# Patient Record
Sex: FEMALE | Race: BLACK OR AFRICAN AMERICAN | Employment: UNEMPLOYED | URBAN - METROPOLITAN AREA
[De-identification: names, ages, dates, MRNs, and addresses within clinical notes are randomized per-mention and may not be internally consistent; named-entity substitution may affect disease eponyms.]

---

## 2021-11-04 LAB — PAP SMEAR, EXTERNAL: NORMAL

## 2022-04-14 ENCOUNTER — TRANSCRIBE ORDER (OUTPATIENT)
Dept: REGISTRATION | Age: 32
End: 2022-04-14

## 2022-04-14 ENCOUNTER — HOSPITAL ENCOUNTER (OUTPATIENT)
Dept: GENERAL RADIOLOGY | Age: 32
Discharge: HOME | End: 2022-04-14
Payer: OTHER GOVERNMENT

## 2022-04-14 DIAGNOSIS — M50.30 DEGENERATION OF CERVICAL INTERVERTEBRAL DISC: Primary | ICD-10-CM

## 2022-04-14 DIAGNOSIS — M50.30 DEGENERATION OF CERVICAL INTERVERTEBRAL DISC: ICD-10-CM

## 2022-04-14 PROCEDURE — 72050 X-RAY EXAM NECK SPINE 4/5VWS: CPT

## 2022-09-26 LAB — HBA1C MFR BLD HPLC: 5.7 %

## 2022-11-08 ENCOUNTER — ANESTHESIA EVENT (OUTPATIENT)
Dept: SURGERY | Age: 32
End: 2022-11-08
Payer: OTHER GOVERNMENT

## 2022-11-09 ENCOUNTER — HOSPITAL ENCOUNTER (OUTPATIENT)
Age: 32
Setting detail: OUTPATIENT SURGERY
Discharge: HOME | End: 2022-11-09
Attending: OBSTETRICS & GYNECOLOGY | Admitting: OBSTETRICS & GYNECOLOGY
Payer: OTHER GOVERNMENT

## 2022-11-09 ENCOUNTER — ANESTHESIA (OUTPATIENT)
Dept: SURGERY | Age: 32
End: 2022-11-09
Payer: OTHER GOVERNMENT

## 2022-11-09 VITALS
HEART RATE: 78 BPM | WEIGHT: 134.92 LBS | RESPIRATION RATE: 13 BRPM | HEIGHT: 59 IN | DIASTOLIC BLOOD PRESSURE: 86 MMHG | OXYGEN SATURATION: 100 % | TEMPERATURE: 98.5 F | BODY MASS INDEX: 27.2 KG/M2 | SYSTOLIC BLOOD PRESSURE: 143 MMHG

## 2022-11-09 LAB — HCG UR QL: NEGATIVE

## 2022-11-09 PROCEDURE — 2709999900 HC NON-CHARGEABLE SUPPLY: Performed by: OBSTETRICS & GYNECOLOGY

## 2022-11-09 PROCEDURE — 77030026236 HC DEV TISS RMVL MYOSUR HOLO -G1: Performed by: OBSTETRICS & GYNECOLOGY

## 2022-11-09 PROCEDURE — 88305 TISSUE EXAM BY PATHOLOGIST: CPT

## 2022-11-09 PROCEDURE — 77030040361 HC SLV COMPR DVT MDII -B

## 2022-11-09 PROCEDURE — 76210000021 HC REC RM PH II 0.5 TO 1 HR: Performed by: OBSTETRICS & GYNECOLOGY

## 2022-11-09 PROCEDURE — 76010000149 HC OR TIME 1 TO 1.5 HR: Performed by: OBSTETRICS & GYNECOLOGY

## 2022-11-09 PROCEDURE — 74011000250 HC RX REV CODE- 250: Performed by: OBSTETRICS & GYNECOLOGY

## 2022-11-09 PROCEDURE — 77030020143 HC AIRWY LARYN INTUB CGAS -A: Performed by: ANESTHESIOLOGY

## 2022-11-09 PROCEDURE — 74011000250 HC RX REV CODE- 250: Performed by: NURSE ANESTHETIST, CERTIFIED REGISTERED

## 2022-11-09 PROCEDURE — 81025 URINE PREGNANCY TEST: CPT

## 2022-11-09 PROCEDURE — 76060000033 HC ANESTHESIA 1 TO 1.5 HR: Performed by: OBSTETRICS & GYNECOLOGY

## 2022-11-09 PROCEDURE — 77030034928 HC DEV UTER SURSND KT HOLO -G1: Performed by: OBSTETRICS & GYNECOLOGY

## 2022-11-09 PROCEDURE — 74011250636 HC RX REV CODE- 250/636: Performed by: OBSTETRICS & GYNECOLOGY

## 2022-11-09 PROCEDURE — 74011250636 HC RX REV CODE- 250/636: Performed by: NURSE ANESTHETIST, CERTIFIED REGISTERED

## 2022-11-09 PROCEDURE — 76210000006 HC OR PH I REC 0.5 TO 1 HR: Performed by: OBSTETRICS & GYNECOLOGY

## 2022-11-09 PROCEDURE — 77030003666 HC NDL SPINAL BD -A: Performed by: OBSTETRICS & GYNECOLOGY

## 2022-11-09 PROCEDURE — 74011250636 HC RX REV CODE- 250/636: Performed by: ANESTHESIOLOGY

## 2022-11-09 PROCEDURE — 77030040922 HC BLNKT HYPOTHRM STRY -A

## 2022-11-09 PROCEDURE — 77030041423 HC SYST FLUID MNGMT FLUENT HOLO -D: Performed by: OBSTETRICS & GYNECOLOGY

## 2022-11-09 RX ORDER — ONDANSETRON 2 MG/ML
4 INJECTION INTRAMUSCULAR; INTRAVENOUS AS NEEDED
Status: DISCONTINUED | OUTPATIENT
Start: 2022-11-09 | End: 2022-11-09 | Stop reason: HOSPADM

## 2022-11-09 RX ORDER — FENTANYL CITRATE 50 UG/ML
INJECTION, SOLUTION INTRAMUSCULAR; INTRAVENOUS AS NEEDED
Status: DISCONTINUED | OUTPATIENT
Start: 2022-11-09 | End: 2022-11-09 | Stop reason: HOSPADM

## 2022-11-09 RX ORDER — SODIUM CHLORIDE 0.9 % (FLUSH) 0.9 %
5-40 SYRINGE (ML) INJECTION AS NEEDED
Status: DISCONTINUED | OUTPATIENT
Start: 2022-11-09 | End: 2022-11-09 | Stop reason: HOSPADM

## 2022-11-09 RX ORDER — NAPROXEN 500 MG/1
500 TABLET ORAL
Qty: 20 TABLET | Refills: 0 | Status: SHIPPED | OUTPATIENT
Start: 2022-11-09

## 2022-11-09 RX ORDER — SODIUM CHLORIDE, SODIUM LACTATE, POTASSIUM CHLORIDE, CALCIUM CHLORIDE 600; 310; 30; 20 MG/100ML; MG/100ML; MG/100ML; MG/100ML
125 INJECTION, SOLUTION INTRAVENOUS CONTINUOUS
Status: DISCONTINUED | OUTPATIENT
Start: 2022-11-09 | End: 2022-11-09 | Stop reason: HOSPADM

## 2022-11-09 RX ORDER — LIDOCAINE HYDROCHLORIDE 10 MG/ML
0.1 INJECTION, SOLUTION EPIDURAL; INFILTRATION; INTRACAUDAL; PERINEURAL AS NEEDED
Status: DISCONTINUED | OUTPATIENT
Start: 2022-11-09 | End: 2022-11-09 | Stop reason: HOSPADM

## 2022-11-09 RX ORDER — KETOROLAC TROMETHAMINE 15 MG/ML
INJECTION, SOLUTION INTRAMUSCULAR; INTRAVENOUS AS NEEDED
Status: DISCONTINUED | OUTPATIENT
Start: 2022-11-09 | End: 2022-11-09 | Stop reason: HOSPADM

## 2022-11-09 RX ORDER — NALOXONE HYDROCHLORIDE 0.4 MG/ML
0.2 INJECTION, SOLUTION INTRAMUSCULAR; INTRAVENOUS; SUBCUTANEOUS
Status: DISCONTINUED | OUTPATIENT
Start: 2022-11-09 | End: 2022-11-09 | Stop reason: HOSPADM

## 2022-11-09 RX ORDER — BUPIVACAINE HYDROCHLORIDE 2.5 MG/ML
INJECTION, SOLUTION EPIDURAL; INFILTRATION; INTRACAUDAL AS NEEDED
Status: DISCONTINUED | OUTPATIENT
Start: 2022-11-09 | End: 2022-11-09 | Stop reason: HOSPADM

## 2022-11-09 RX ORDER — ONDANSETRON 2 MG/ML
INJECTION INTRAMUSCULAR; INTRAVENOUS AS NEEDED
Status: DISCONTINUED | OUTPATIENT
Start: 2022-11-09 | End: 2022-11-09 | Stop reason: HOSPADM

## 2022-11-09 RX ORDER — DEXAMETHASONE SODIUM PHOSPHATE 4 MG/ML
INJECTION, SOLUTION INTRA-ARTICULAR; INTRALESIONAL; INTRAMUSCULAR; INTRAVENOUS; SOFT TISSUE AS NEEDED
Status: DISCONTINUED | OUTPATIENT
Start: 2022-11-09 | End: 2022-11-09 | Stop reason: HOSPADM

## 2022-11-09 RX ORDER — SODIUM CHLORIDE, SODIUM LACTATE, POTASSIUM CHLORIDE, CALCIUM CHLORIDE 600; 310; 30; 20 MG/100ML; MG/100ML; MG/100ML; MG/100ML
100 INJECTION, SOLUTION INTRAVENOUS CONTINUOUS
Status: DISCONTINUED | OUTPATIENT
Start: 2022-11-09 | End: 2022-11-09 | Stop reason: HOSPADM

## 2022-11-09 RX ORDER — LIDOCAINE HYDROCHLORIDE 20 MG/ML
INJECTION, SOLUTION EPIDURAL; INFILTRATION; INTRACAUDAL; PERINEURAL AS NEEDED
Status: DISCONTINUED | OUTPATIENT
Start: 2022-11-09 | End: 2022-11-09 | Stop reason: HOSPADM

## 2022-11-09 RX ORDER — SODIUM CHLORIDE 0.9 % (FLUSH) 0.9 %
5-40 SYRINGE (ML) INJECTION EVERY 8 HOURS
Status: DISCONTINUED | OUTPATIENT
Start: 2022-11-09 | End: 2022-11-09 | Stop reason: HOSPADM

## 2022-11-09 RX ORDER — MIDAZOLAM HYDROCHLORIDE 1 MG/ML
INJECTION, SOLUTION INTRAMUSCULAR; INTRAVENOUS AS NEEDED
Status: DISCONTINUED | OUTPATIENT
Start: 2022-11-09 | End: 2022-11-09 | Stop reason: HOSPADM

## 2022-11-09 RX ORDER — PROPOFOL 10 MG/ML
INJECTION, EMULSION INTRAVENOUS AS NEEDED
Status: DISCONTINUED | OUTPATIENT
Start: 2022-11-09 | End: 2022-11-09 | Stop reason: HOSPADM

## 2022-11-09 RX ORDER — FLUMAZENIL 0.1 MG/ML
0.2 INJECTION INTRAVENOUS
Status: DISCONTINUED | OUTPATIENT
Start: 2022-11-09 | End: 2022-11-09 | Stop reason: HOSPADM

## 2022-11-09 RX ORDER — HYDROMORPHONE HYDROCHLORIDE 1 MG/ML
.5-1 INJECTION, SOLUTION INTRAMUSCULAR; INTRAVENOUS; SUBCUTANEOUS
Status: DISCONTINUED | OUTPATIENT
Start: 2022-11-09 | End: 2022-11-09 | Stop reason: HOSPADM

## 2022-11-09 RX ADMIN — FENTANYL CITRATE 25 MCG: 50 INJECTION, SOLUTION INTRAMUSCULAR; INTRAVENOUS at 13:43

## 2022-11-09 RX ADMIN — FENTANYL CITRATE 25 MCG: 50 INJECTION, SOLUTION INTRAMUSCULAR; INTRAVENOUS at 14:09

## 2022-11-09 RX ADMIN — CEFAZOLIN SODIUM 2 G: 1 POWDER, FOR SOLUTION INTRAMUSCULAR; INTRAVENOUS at 13:56

## 2022-11-09 RX ADMIN — MIDAZOLAM HYDROCHLORIDE 2 MG: 1 INJECTION, SOLUTION INTRAMUSCULAR; INTRAVENOUS at 13:41

## 2022-11-09 RX ADMIN — PROPOFOL 50 MG: 10 INJECTION, EMULSION INTRAVENOUS at 13:50

## 2022-11-09 RX ADMIN — FENTANYL CITRATE 25 MCG: 50 INJECTION, SOLUTION INTRAMUSCULAR; INTRAVENOUS at 13:46

## 2022-11-09 RX ADMIN — PROPOFOL 150 MG: 10 INJECTION, EMULSION INTRAVENOUS at 13:48

## 2022-11-09 RX ADMIN — FENTANYL CITRATE 25 MCG: 50 INJECTION, SOLUTION INTRAMUSCULAR; INTRAVENOUS at 13:55

## 2022-11-09 RX ADMIN — HYDROMORPHONE HYDROCHLORIDE 0.5 MG: 1 INJECTION, SOLUTION INTRAMUSCULAR; INTRAVENOUS; SUBCUTANEOUS at 15:38

## 2022-11-09 RX ADMIN — SODIUM CHLORIDE, POTASSIUM CHLORIDE, SODIUM LACTATE AND CALCIUM CHLORIDE 100 ML/HR: 600; 310; 30; 20 INJECTION, SOLUTION INTRAVENOUS at 11:56

## 2022-11-09 RX ADMIN — ONDANSETRON HYDROCHLORIDE 4 MG: 2 SOLUTION INTRAMUSCULAR; INTRAVENOUS at 14:07

## 2022-11-09 RX ADMIN — KETOROLAC TROMETHAMINE 30 MG: 15 INJECTION, SOLUTION INTRAMUSCULAR; INTRAVENOUS at 14:46

## 2022-11-09 RX ADMIN — DEXAMETHASONE SODIUM PHOSPHATE 8 MG: 4 INJECTION, SOLUTION INTRAMUSCULAR; INTRAVENOUS at 13:55

## 2022-11-09 RX ADMIN — LIDOCAINE HYDROCHLORIDE 60 MG: 20 INJECTION, SOLUTION EPIDURAL; INFILTRATION; INTRACAUDAL; PERINEURAL at 13:48

## 2022-11-09 NOTE — ANESTHESIA PREPROCEDURE EVALUATION
Relevant Problems   No relevant active problems       Anesthetic History   No history of anesthetic complications            Review of Systems / Medical History  Patient summary reviewed and pertinent labs reviewed    Pulmonary  Within defined limits                 Neuro/Psych   Within defined limits        Pertinent negatives: No seizures, TIA and CVA   Cardiovascular                Pertinent negatives: No past MI, angina and CHF  Exercise tolerance: >4 METS     GI/Hepatic/Renal  Within defined limits           Pertinent negatives: No renal disease   Endo/Other  Within defined limits        Pertinent negatives: No diabetes   Other Findings              Physical Exam    Airway  Mallampati: II  TM Distance: 4 - 6 cm  Neck ROM: normal range of motion   Mouth opening: Normal     Cardiovascular      Rate: normal         Dental  No notable dental hx       Pulmonary  Breath sounds clear to auscultation               Abdominal  GI exam deferred       Other Findings            Anesthetic Plan    ASA: 1  Anesthesia type: general          Induction: Intravenous  Anesthetic plan and risks discussed with: Patient      GA, LMA

## 2022-11-09 NOTE — BRIEF OP NOTE
Brief Postoperative Note    Patient: Levorn Means  YOB: 1990  MRN: [id number]    Date of Procedure: 11/9/2022     Pre-Op Diagnosis:   Menorrhagia with regular cycle [N92.0]  Polycystic ovarian disease [E28.2]    Post-Op Diagnosis: Same as preoperative diagnosis. Procedure(s): HYSTEROSCOPIC DILATION AND CURETTAGE WITH MYOSURE  ENDOMETRIAL ABLATION WITH Rafaela Parks    Surgeon(s):  Nichole Spencer MD    Surgical Assistant: None    Anesthesia: General     Estimated Blood Loss (mL): 20ml    Hysteroscopic deficit: 952BA    Complications: None    Specimens:   ID Type Source Tests Collected by Time Destination   1 : ENDOMETRIAL CURRETTINGS Preservative Uterus  Nichole Spencer MD 11/9/2022 1445 Pathology        Implants: * No implants in log *    Drains: * No LDAs found *    Findings: Anteverted uterus that sounded to 9cm. Large amount of fluffy endometrium and polypoid tissue. Normal tubal ostia bilaterally. Novasure stats: Lg 5.5cm; Wd 4.7cm; Power 142W;  Time 1:09  After ablation hysteroscopy revealed ~100% uterine cavity ablation extending into the endocervical canal.     Dict: [id number]    Electronically Signed by Lamin Tsang MD on 11/9/2022 at 3:14 PM

## 2022-11-09 NOTE — DISCHARGE INSTRUCTIONS
Discharge Instructions for D&C (with or without ablation)    Patient ID:  Ann Azevedo  [id number]  27 y.o.  1990    Take Home Medications     Naproxen 500mg bid prn pain      What to do at Home    Recommended diet: AS TOLERATED                                    AVOID [address]    Recommended activity: REST TODAY. YOU MAY RESUME DRIVING AND REGULAR ACTIVITIES TOMORROW IF YOU ARE NOT TAKING PRESCRIPTION PAIN MEDICATIONS. NOTHING IN VAGINA FOR 4 WEEKS      Call your doctor if you experience any of the following symptoms. FEVER OR CHILLS  HEAVY VAGINAL DRAINAGE OR SMELLY DISCHARGE  PAIN OR SWELLING IN YOU LEGS    Follow-up with Dr. Sophie Hatch in 2 weeks. What to Expect at [address] might feel a bit sleepy, groggy or nauseous today because of the anesthetic or pain medication you received. Do not drive today. These symptoms should resolve by tomorrow. You will probably feel some cramping over the next day or two- this is normal.  You may take an over-the-counter pain medication such as Tylenol, Aleve, Motrin, Advil (ibuprofen) or you may have been given a prescription pain medication. Try to limit use of prescription pain medication to just a day or two, as they can cause constipation. You will probably experience some light vaginal bleeding or spotting. This is normal.  Please use a pad if needed. Do not douche or use tampons. If you had an ablation procedure (Novasure or Thermachoice) you might have some watery vaginal discharge for several weeks. How can you care for yourself at home? Activity  You can return to work and normal activities tomorrow or the next day. If you are feeling well, you can also resume exercise. If you are having pain or not feeling well, you should wait a few days before exercising. Diet  You can eat your normal diet.  If your stomach is upset, try bland, low-fat foods like plain rice, broiled chicken, toast, and yogurt. Drink plenty of fluids (unless your doctor tells you not to). You may notice that your bowel movements are not regular right after your surgery, especially if you are taking prescription pain medications. This is common. Try to avoid constipation and straining with bowel movements. You may want to take a fiber supplement every day. If you have not had a bowel movement after a couple of days, ask your doctor about taking a mild laxative. Medicines  Take pain medicines exactly as directed. If the doctor gave you a prescription medicine for pain, take it as prescribed. If you are not taking a prescription pain medicine, take an over-the-counter medicine such as acetaminophen (Tylenol), ibuprofen (Advil, Motrin), or naproxen (Aleve). Read and follow all instructions on the label. Do not take two or more pain medicines at the same time unless the doctor told you to. Many pain medicines contain acetaminophen, which is Tylenol. Too much Tylenol can be harmful. If your doctor prescribed antibiotics, take them as directed. Do not stop taking them just because you feel better. You need to take the full course of antibiotics. If you think your pain medicine is making you sick to your stomach: Take your medicine after meals (unless your doctor tells you not to). Ask your doctor for a different pain medicine. Follow-up care is a key part of your treatment and safety. Be sure to make and go to all appointments, and call your doctor if you are having problems. Its also a good idea to know your test results and keep a list of the medicines you take. When should you call for help? Call 911 anytime you think you may need emergency care. For example, call if:  You pass out (lose consciousness). You have sudden chest pain and shortness of breath, or you cough up blood. You have severe pain in your belly.     Call your doctor now or seek immediate medical care if:  You have bright red vaginal bleeding that soaks one or more pads in an hour, or you have large clots. Your have foul-smelling discharge from your vagina. You are sick to your stomach or cannot keep fluids down. You have pain that does not get better after you take pain medicine. You have signs of infection, such as: Increased pain, swelling, warmth, or redness. A fever. You have signs of a blood clot, such as:  Pain in your calf, back of knee, thigh, or groin. Redness and swelling in your leg or groin. You have trouble passing urine or stool, especially if you have pain or swelling in your lower belly. You have hot flashes, sweating, flushing, or a fast or pounding heartbeat. Watch closely for changes in your health, and be sure to contact your doctor if:  You do not have a bowel movement after taking a laxative. DISCHARGE SUMMARY from your Nurse      PATIENT INSTRUCTIONS    After general anesthesia or intravenous sedation, for 24 hours or while taking prescription Narcotics:  Limit your activities  Do not drive and operate hazardous machinery  Do not make important personal or business decisions  Do  not drink alcoholic beverages  If you have not urinated within 8 hours after discharge, please contact your surgeon on call. Report the following to your surgeon:  Excessive pain, swelling, redness or odor of or around the surgical area  Temperature over 100.5  Nausea and vomiting lasting longer than 4 hours or if unable to take medications  Any signs of decreased circulation or nerve impairment to extremity: change in color, persistent  numbness, tingling, coldness or increase pain  Any questions      GOOD HELP TO FIGHT AN INFECTION  Here are a few tip to help reduce the chance of getting an infection after surgery:  Wash Your Hands  Good handwashing is the most important thing you and your caregiver can do. Wash before and after caring for any wounds. Dry your hand with a clean towel.   Wash with soap and water for at least 20 seconds. A TIP: sing the \"Happy Birthday\" song through one time while washing to help with the timing. Use a hand  in between washings. Shower  When your surgeon says it is OK to take a shower, use a new bar of antibacterial soap (if that is what you use, and keep that bar of soap ONLY for your use), or antibacterial body wash. Use a clean wash cloth or sponge when you bathe. Dry off with a clean towel  after every bath - be careful around any wounds, skin staples, sutures or surgical glue over/on wounds. Do not enter swimming pools, hot tubs, lakes, rivers and/or ocean until wounds are healed and your doctor/surgeon says it is OK. Use Clean Sheets  Sleep on freshly laundered sheets after your surgery. Keep the surgery site covered with a clean, dry bandage (if instructed to do so). If the bandage becomes soiled, reapply a new, dry, clean bandage. Do not allow pets to sleep with you while your wound is healing. Lifestyle Modification and Controlling Your Blood Sugar  Smoking slows wound healing. Stop smoking and limit exposure to second-hand smoke. High blood sugar slows wound healing. Eat a well-balanced diet to provide proper nutrition while healing  Monitor your blood sugar (if you are a diabetic) and take your medications as you are suppose to so you can control you blood sugar after surgery. COUGH AND DEEP BREATHE    Breathing deeply and coughing are very important exercises to do after surgery. Deep breathing and coughing open the little air tubes and air sacks in your lungs. You take deep breaths every day. You may not even notice - it is just something you do when you sigh or yawn. It is a natural exercise you do to keep these air passages open. After surgery, take deep breaths and cough, on purpose. DIRECTIONS:  Take 10 to 15 slow deep breaths every hour while awake. Breathe in deeply, and hold it for 2 seconds.   Exhale slowly through puckered lips, like blowing up a balloon. After every 4th or 5th deep breath, hug your pillow to your chest or belly and give a hard, deep cough. Yes, it will probably hurt. But doing this exercise is a very important part of healing after surgery. Take your pain medicine to help you do this exercise without too much pain. Coughing and deep breathing help prevent bronchitis and pneumonia after surgery. If you had chest or belly surgery, use a pillow as a \"hug bartolo\" and hold it tightly to your chest or belly when you cough. ANKLE PUMPS    Ankle pumps increase the circulation of oxygenated blood to your lower extremities and decrease your risk for circulation problems such as blood clots. They also stretch the muscles, tendons and ligaments in your foot and ankle, and prevent joint contracture in the ankle and foot, especially after surgeries on the legs. It is important to do ankle pump exercises regularly after surgery because immobility increases your risk for developing a blood clot. Your doctor may also have you take an Aspirin for the next few days as well. If your doctor did not ask you to take an Aspirin, consult with him before starting Aspirin therapy on your own. The exercise is quite simple. Slowly point your foot forward, feeling the muscles on the top of your lower leg stretch, and hold this position for 5 seconds. Next, pull your foot back toward you as far as possible, stretching the calf muscles, and hold that position for 5 seconds. Repeat with the other foot. Perform 10 repetitions every hour while awake for both ankles if possible (down and then up with the foot once is one repetition). You should feel gentle stretching of the muscles in your lower leg when doing this exercise. If you feel pain, or your range of motion is limited, don't push too hard. Only go the limit your joint and muscles will let you go. If you have increasing pain, progressively worsening leg warmth or swelling, STOP the exercise and call your doctor. MEDICATION AND   SIDE EFFECT GUIDE    The [facility] MEDICATION AND SIDE EFFECT GUIDE was provided to the PATIENT AND CARE PROVIDER. Information provided includes instruction about drug purpose and common side effects for the following medications:   naproxen        These are general instructions for a healthy lifestyle:    *   Please give a list of your current medications to your Primary Care Provider. *   Please update this list whenever your medications are discontinued, doses are changed, or new medications (including over-the-counter products) are added. *   Please carry medication information at all times in case of emergency situations. About Smoking  No smoking / No tobacco products  Avoid exposure to second hand smoke     Surgeon General's Warning:  Quitting smoking now greatly reduces serious risk to your health. Obesity, smoking, and sedentary lifestyle greatly increases your risk for illness and disease. A healthy diet, regular physical exercise & weight monitoring are important for maintaining a healthy lifestyle. Congestive Heart Failure  You may be retaining fluid if you have a history of heart failure or if you experience any of the following symptoms:  Weight gain of 3 pounds or more overnight or 5 pounds in a week, increased swelling in your hands or feet or shortness of breath while lying flat in bed. Please call your doctor as soon as you notice any of these symptoms; do not wait until your next office visit. Learning About Coronavirus [phone])  Coronavirus [phone]): Overview  What is coronavirus (COVID-19)? The coronavirus disease (COVID-19) is caused by a virus. It is an illness that was first found in [place], [place], in December 2019. It has since spread worldwide. The virus can cause fever, cough, and trouble breathing.  In severe cases, it can cause pneumonia and make it hard to breathe without help. It can cause death. Coronaviruses are a large group of viruses. They cause the common cold. They also cause more serious illnesses like Middle East respiratory syndrome (MERS) and severe acute respiratory syndrome (SARS). COVID-19 is caused by a novel coronavirus. That means it's a new type that has not been seen in people before. This virus spreads person-to-person through droplets from coughing and sneezing. It can also spread when you are close to someone who is infected. And it can spread when you touch something that has the virus on it, such as a doorknob or a tabletop. What can you do to protect yourself from coronavirus (COVID-19)? The best way to protect yourself from getting sick is to: Avoid areas where there is an outbreak. Avoid contact with people who may be infected. Wash your hands often with soap or alcohol-based hand sanitizers. Avoid crowds and try to stay at least 6 feet away from other people. Wash your hands often, especially after you cough or sneeze. Use soap and water, and scrub for at least 20 seconds. If soap and water aren't available, use an alcohol-based hand . Avoid touching your mouth, nose, and eyes. What can you do to avoid spreading the virus to others? To help avoid spreading the virus to others:  Cover your mouth with a tissue when you cough or sneeze. Then throw the tissue in the trash. Use a disinfectant to clean things that you touch often. Stay home if you are sick or have been exposed to the virus. Don't go to school, work, or public areas. And don't use public transportation. If you are sick:  Leave your home only if you need to get medical care. But call the doctor's office first so they know you're coming. And wear a face mask, if you have one. If you have a face mask, wear it whenever you're around other people. It can help stop the spread of the virus when you cough or sneeze.   Clean and disinfect your home every day. Use household  and disinfectant wipes or sprays. Take special care to clean things that you grab with your hands. These include doorknobs, remote controls, phones, and handles on your refrigerator and microwave. And don't forget countertops, tabletops, bathrooms, and computer keyboards. When to call for help  Call 911 anytime you think you may need emergency care. For example, call if:  You have severe trouble breathing. (You can't talk at all.)  You have constant chest pain or pressure. You are severely dizzy or lightheaded. You are confused or can't think clearly. Your face and lips have a blue color. You pass out (lose consciousness) or are very hard to wake up. Call your doctor now if you develop symptoms such as:  Shortness of breath. Fever. Cough. If you need to get care, call ahead to the doctor's office for instructions before you go. Make sure you wear a face mask, if you have one, to prevent exposing other people to the virus. Where can you get the latest information? The following health organizations are tracking and studying this virus. Their websites contain the most up-to-date information. Orly Gins also learn what to do if you think you may have been exposed to the virus. U.S. Centers for Disease Control and Prevention (CDC): The CDC provides updated news about the disease and travel advice. The website also tells you how to prevent the spread of infection. www.cdc.gov  World Health Organization [facility]): WHO offers information about the virus outbreaks. WHO also has travel advice. www.who.int  Current as of: April 1, 2020               Content Version: 12.4  © 7086-2515 [employer].    Care instructions adapted under license by your healthcare professional. If you have questions about a medical condition or this instruction, always ask your healthcare professional. [address] any warranty or liability for your use of this information. The discharge information has been reviewed with the patient and spouse. Any questions and concerns from the patient and spouse have been addressed. The patient and spouse verbalized understanding. CONTENTS FOUND IN YOUR DISCHARGE ENVELOPE:  [x]     Surgeon and Hospital Discharge Instructions  [x]     [facility] Care Provider Card  [x]     Medication & Side Effect Guide            (your newly prescribed medications have been marked/highlighted showing the most common side effects from   the classes of drugs on your prescriptions)  [x]     Medication Prescription(s) x 1 ( [x] These have been sent electronically to your pharmacy by your surgeon,   - OR -       your surgeon has already provided these to you during a previous/pre-op office visit)  []     [address]  []     Physical Therapy Prescription  []     Follow-up Appointment Cards  []     Surgery-related Pictures/Media  []     Pain block and/or block with On-Q Catheter from Anesthesia Service (information included in your instructions above)  []     Medical device information sheets/pamphlets from their    []     School/work excuse note. []     /parent work excuse note. The following personal items collected during your admission are returned to you:   Dental Appliance: Dental Appliances: None  Vision: Visual Aid: Glasses  Hearing Aid:    Jewelry: Jewelry: None  Clothing: Clothing:  (Street clothing to locker)  Other Valuables:  Other Valuables: Cell Phone, Eyeglasses  Valuables sent to safe:

## 2022-11-09 NOTE — ANESTHESIA POSTPROCEDURE EVALUATION
Procedure(s): HYSTEROSCOPIC DILATION AND CURRETAGE WITH MYOSURE/ ENDOMETRIAL ABLATION WITH Tanner Hall. general    Anesthesia Post Evaluation        Patient location during evaluation: PACU  Level of consciousness: awake  Pain management: adequate  Airway patency: patent  Anesthetic complications: no  Cardiovascular status: acceptable  Respiratory status: acceptable  Hydration status: acceptable  Post anesthesia nausea and vomiting:  none      INITIAL Post-op Vital signs:   Vitals Value Taken Time   /94 11/09/22 1530   Temp 36.9 °C (98.5 °F) 11/09/22 1501   Pulse 82 11/09/22 1545   Resp 12 11/09/22 1545   SpO2 99 % 11/09/22 1545   Vitals shown include unvalidated device data.

## 2022-11-09 NOTE — PROGRESS NOTES
The procedure and consent were reviewed with the patient. Her questions were answered. There has been no interval change from H&P. Patient is ready for surgery today.

## 2022-11-10 NOTE — OP NOTES
Temo Kuhn [facility] 77  OPERATIVE REPORT    Name:  Elana Nageotte  MR#:  [id number]  :  1990  ACCOUNT #:  [de-identified]  DATE OF SERVICE:  2022    PREOPERATIVE DIAGNOSES:  1. Menorrhagia. 2.  Polycystic ovarian disease. POSTOPERATIVE DIAGNOSES:  1. Menorrhagia. 2.  Polycystic ovarian disease. PROCEDURE PERFORMED:  1. Hysteroscopic dilation and curettage with MyoSure device. 2.  Endometrial ablation with Novasure device. SURGEON:  Jorge A Soto MD    ASSISTANT:  House staff. ANESTHESIA:  General endotracheal.    COMPLICATIONS:  None. SPECIMENS REMOVED:  Endometrial curettings. IMPLANTS:  None. DRAINS:  None. ESTIMATED BLOOD LOSS:  20 mL. HYSTEROSCOPIC DEFICIT:  330 mL. FINDINGS:  Anteverted uterus that sounded to 9 cm. Large amount of fluffy endometrium and polypoid tissue throughout the uterine cavity. Normal tubal ostia bilaterally. NOVASURE STATISTICS:  Length 5.5 cm; width 4.7 cm; power 142 costa; time 1 minute 9 seconds. After ablation, hysteroscopy revealed approximately 100% uterine cavity ablation extending into the endocervical canal.    PROCEDURE:  After appropriate consent was obtained, the patient was administrated preoperative antibiotics, taken to the operating room. General anesthesia was administered and found be adequate. She was prepped and draped in dorsal lithotomy position in normal sterile fashion. A speculum was placed in the vagina. The cervix was visualized and grasped with tenaculum. The cervix was then dilated using Hegar dilators up to #7. The cervix was infiltrated with 0.25% Marcaine circumferentially prior to that. The cervix was then dilated and a 6-mm hysteroscope was placed through the cervix into the uterine cavity. The uterine cavity was distended with medium. Abundance of fluffy endometrial tissue was seen.   The MyoSure reach was then used in order to thoroughly curettage the uterine cavity in all four quadrants. Once this was completed, the hysteroscope was removed and the Novasure device was brought to table. The uterus was then sounded to approximately 9 cm. The Novasure device measuring stick had the cavity length of 5.5 cm. The Novasure array was then placed and deployed. Width was 4.7 cm. The time of the ablation was 1 minute 9 seconds at a power of 142 costa. Once the ablation was completed, the Novasure device was removed and the hysteroscope was replaced, revealing a thoroughly charred area throughout the surface of the uterus from the fundus to the lower uterine segment. The ablation extended into the endocervical canal.  The patient tolerated all these procedures well. All counts were correct and she was awakened and taken to the recovery room in stable condition.       Eda Howell MD      [email or web address]  D:  11/09/2022 15:22  T:  11/09/2022 20:52  JOB #:  [id number]

## 2023-06-05 ENCOUNTER — OFFICE VISIT (OUTPATIENT)
Facility: CLINIC | Age: 33
End: 2023-06-05
Payer: OTHER GOVERNMENT

## 2023-06-05 VITALS
RESPIRATION RATE: 16 BRPM | DIASTOLIC BLOOD PRESSURE: 84 MMHG | WEIGHT: 137.6 LBS | OXYGEN SATURATION: 100 % | HEIGHT: 59 IN | SYSTOLIC BLOOD PRESSURE: 130 MMHG | BODY MASS INDEX: 27.74 KG/M2 | HEART RATE: 76 BPM | TEMPERATURE: 97.9 F

## 2023-06-05 DIAGNOSIS — R10.30 LOWER ABDOMINAL PAIN: Primary | ICD-10-CM

## 2023-06-05 DIAGNOSIS — K76.0 FATTY LIVER: ICD-10-CM

## 2023-06-05 DIAGNOSIS — Z11.4 SCREENING FOR HIV WITHOUT PRESENCE OF RISK FACTORS: ICD-10-CM

## 2023-06-05 DIAGNOSIS — Z13.220 SCREENING CHOLESTEROL LEVEL: ICD-10-CM

## 2023-06-05 DIAGNOSIS — R73.03 PREDIABETES: ICD-10-CM

## 2023-06-05 DIAGNOSIS — Z11.59 NEED FOR HEPATITIS C SCREENING TEST: ICD-10-CM

## 2023-06-05 DIAGNOSIS — D25.1 INTRAMURAL LEIOMYOMA OF UTERUS: ICD-10-CM

## 2023-06-05 DIAGNOSIS — R10.30 LOWER ABDOMINAL PAIN: ICD-10-CM

## 2023-06-05 PROCEDURE — 99203 OFFICE O/P NEW LOW 30 MIN: CPT | Performed by: INTERNAL MEDICINE

## 2023-06-05 SDOH — ECONOMIC STABILITY: HOUSING INSECURITY
IN THE LAST 12 MONTHS, WAS THERE A TIME WHEN YOU DID NOT HAVE A STEADY PLACE TO SLEEP OR SLEPT IN A SHELTER (INCLUDING NOW)?: NO

## 2023-06-05 SDOH — ECONOMIC STABILITY: INCOME INSECURITY: HOW HARD IS IT FOR YOU TO PAY FOR THE VERY BASICS LIKE FOOD, HOUSING, MEDICAL CARE, AND HEATING?: NOT HARD AT ALL

## 2023-06-05 SDOH — ECONOMIC STABILITY: FOOD INSECURITY: WITHIN THE PAST 12 MONTHS, THE FOOD YOU BOUGHT JUST DIDN'T LAST AND YOU DIDN'T HAVE MONEY TO GET MORE.: NEVER TRUE

## 2023-06-05 SDOH — ECONOMIC STABILITY: FOOD INSECURITY: WITHIN THE PAST 12 MONTHS, YOU WORRIED THAT YOUR FOOD WOULD RUN OUT BEFORE YOU GOT MONEY TO BUY MORE.: NEVER TRUE

## 2023-06-05 ASSESSMENT — PATIENT HEALTH QUESTIONNAIRE - PHQ9
1. LITTLE INTEREST OR PLEASURE IN DOING THINGS: 0
2. FEELING DOWN, DEPRESSED OR HOPELESS: 0
SUM OF ALL RESPONSES TO PHQ QUESTIONS 1-9: 0
SUM OF ALL RESPONSES TO PHQ9 QUESTIONS 1 & 2: 0
SUM OF ALL RESPONSES TO PHQ QUESTIONS 1-9: 0

## 2023-06-05 ASSESSMENT — ENCOUNTER SYMPTOMS
DIARRHEA: 0
SHORTNESS OF BREATH: 0
VOMITING: 0
NAUSEA: 0
ABDOMINAL PAIN: 0
COUGH: 0

## 2023-06-05 NOTE — PROGRESS NOTES
[address] [facility] Internal Medicine  [address]  [place], [address] [facility]  Phone: [phone]      Jose Antonio Mays (: 1990) is a 26 y.o. female, new patient, here for evaluation of the following chief complaint(s):  New Patient         SUBJECTIVE/OBJECTIVE:  HPI:  Jose Antonio Mays is being seen today to establish care. She was previously seen by [facility] however she states it takes too long to get appointments. She went to [facility] ER yesterday due to sharp pain in her lower abdomen. She was having nausea/vomiting. She had labwork done as well as CT scan. She states CT scan showed fibroids and fatty liver. She states she was unable to walk due to pain radiating down to her legs. She has a hoarse voice today due to vomiting. She states pain has subsided some. She does have some numbness and soreness. Current pain level: 0/10. She does have a history of anemia and prediabetes. She was on Iron supplements but then her provider told her it was back to normal and she could stop. She did have a endometrial ablation done to heavy, heavy cycles. She is not currently having menstrual cycles. She had her last pap in 10/2022 and states it was normal.  She is not currently on any medications and uses [employer] [place]. She has had COVID x2 Pfizer. She states they were stationed in [place]. She normally gets flu vaccines yearly. Lab review from yesterday showed white count of 9.72. Hemoglobin 13.4. Platelet count 794. Granulocytes 7.9%. Immature granulocytes 0.04. Sodium 140. Potassium 4. Blood sugar 122. LFTs within normal limit. Lipase 38. Beta hCG screen negative. Urine analysis showed 1+ protein. Blood negative. CT abdomen and pelvics: Liver diffusely decreased attenuation of hepatic parenchyma which could be seen with hepatic steatosis. No gallstones. Lobulated contour of the uterus which could be related to uterine fibroids.   Further evaluation with nonemergent

## 2023-07-18 ENCOUNTER — TELEPHONE (OUTPATIENT)
Facility: CLINIC | Age: 33
End: 2023-07-18

## 2023-07-18 NOTE — TELEPHONE ENCOUNTER
----- Message from Margo Head sent at 7/18/2023 11:36 AM EDT -----  Subject: Appointment Request    Reason for Call: Established Patient Appointment needed: Routine ED Follow   Up Visit    QUESTIONS    Reason for appointment request? No appointments available during search     Additional Information for Provider? open to first available appt. went to   ED on 7/17/23 for chest and left arm pain , heart paputations. was told to   follow up with PCP.  as blood pressure was 175/120; [id number] over 122  ---------------------------------------------------------------------------  --------------  Coty Singer INFO  [phone]; OK to leave message on voicemail  ---------------------------------------------------------------------------  --------------  SCRIPT ANSWERS

## 2023-08-01 ENCOUNTER — OFFICE VISIT (OUTPATIENT)
Facility: CLINIC | Age: 33
End: 2023-08-01
Payer: OTHER GOVERNMENT

## 2023-08-01 VITALS
TEMPERATURE: 98.3 F | BODY MASS INDEX: 27.7 KG/M2 | WEIGHT: 137.4 LBS | HEART RATE: 75 BPM | SYSTOLIC BLOOD PRESSURE: 140 MMHG | DIASTOLIC BLOOD PRESSURE: 94 MMHG | HEIGHT: 59 IN | OXYGEN SATURATION: 100 % | RESPIRATION RATE: 16 BRPM

## 2023-08-01 DIAGNOSIS — K76.0 FATTY LIVER: ICD-10-CM

## 2023-08-01 DIAGNOSIS — I10 ESSENTIAL HYPERTENSION, BENIGN: Primary | ICD-10-CM

## 2023-08-01 DIAGNOSIS — R07.89 ATYPICAL CHEST PAIN: ICD-10-CM

## 2023-08-01 DIAGNOSIS — E78.00 HYPERCHOLESTEREMIA: ICD-10-CM

## 2023-08-01 PROCEDURE — 3075F SYST BP GE 130 - 139MM HG: CPT | Performed by: INTERNAL MEDICINE

## 2023-08-01 PROCEDURE — 99214 OFFICE O/P EST MOD 30 MIN: CPT | Performed by: INTERNAL MEDICINE

## 2023-08-01 PROCEDURE — 3080F DIAST BP >= 90 MM HG: CPT | Performed by: INTERNAL MEDICINE

## 2023-08-01 RX ORDER — TRIAMTERENE AND HYDROCHLOROTHIAZIDE 37.5; 25 MG/1; MG/1
1 TABLET ORAL EVERY MORNING
COMMUNITY
Start: 2023-07-21

## 2023-08-01 ASSESSMENT — ENCOUNTER SYMPTOMS
ABDOMINAL PAIN: 0
DIARRHEA: 0
COUGH: 0
SHORTNESS OF BREATH: 0
VOMITING: 0
NAUSEA: 0

## 2023-08-01 NOTE — PROGRESS NOTES
Chief Complaint   Patient presents with    Follow-up     CJW; 7/18/2023         1. \"Have you been to the ER, urgent care clinic since your last visit? Hospitalized since your last visit? \" Yes Where: [facility]    2. \"Have you seen or consulted any other health care providers outside of the [address] [place] [address] since your last visit? \" No     3. For patients aged 43-73: Has the patient had a colonoscopy / FIT/ Cologuard? NA - based on age      If the patient is female:    4. For patients aged 43-66: Has the patient had a mammogram within the past 2 years? NA - based on age or sex      11. For patients aged 21-65: Has the patient had a pap smear?  Yes - no Care Gap present

## 2023-08-01 NOTE — PROGRESS NOTES
[address] [facility] Internal Medicine  [address] [facility] [address]  Phone: [phone]      Vickie Brown (: 1990) is a 26 y.o. female, established patient, here for evaluation of the following chief complaint(s):  Follow-up ([address] [place] [address]; 2023)         SUBJECTIVE/OBJECTIVE:  HPI:  Vickie Brown is being seen today for ER follow up. She was seen at [address] [place] [address] on 2023 after going to a freestanding ER the day before due to chest pain and palpitations for the 3 days prior to visit. She stated she was feeling SOB when she feels like her heart is racing. She also complained of a burning in her chest. She had a workup at the [facility] ER on 2023 which was negative and included x-ray, EKG, and labs. She states the following day at [address] [place] [address] that she was feeling worse that day. She had chest x-ray done which was negative as well as normal EKG. Home with sucralfate and famotidine. [facility] and placed on triamterene. She states she has not had any episodes of chest pain since her ER visit. She was referred to [facility] and has appointment on 8/3/2023. Patient states she has been taking triamterene only if her blood pressure has been elevated. She does not need any refillls. Patient had her routine blood work done on 2023 CBC was within normal limit. Platelet count 576. Blood sugar 92. BUN/creatinine normal.  Potassium 4.3. LFTs within normal limit. Urine analysis showed many bacteria. Nitrate negative. No WBC. Total cholesterol was 203. HDL 73. . Hemoglobin A1c 5.4 decreased from 5.7. Hepatitis C virus was negative. HIV was negative. Patient's lab done in the emergency room showed sodium 139. Potassium 3.8.  TSH was 1.64. Troponin was negative. Pregnancy test was negative.     Current Outpatient Medications   Medication Sig    triamterene-hydroCHLOROthiazide (MAXZIDE-25) 37.5-25 MG per tablet Take 1

## 2023-09-19 ENCOUNTER — OFFICE VISIT (OUTPATIENT)
Age: 33
End: 2023-09-19
Payer: OTHER GOVERNMENT

## 2023-09-19 VITALS — HEIGHT: 59 IN | WEIGHT: 137 LBS | BODY MASS INDEX: 27.62 KG/M2

## 2023-09-19 DIAGNOSIS — N61.1 ABSCESS OF BREAST, RIGHT: Primary | ICD-10-CM

## 2023-09-19 PROCEDURE — 10160 PNXR ASPIR ABSC HMTMA BULLA: CPT | Performed by: SURGERY

## 2023-09-19 PROCEDURE — 99203 OFFICE O/P NEW LOW 30 MIN: CPT | Performed by: SURGERY

## 2023-09-19 RX ORDER — OXYCODONE HYDROCHLORIDE AND ACETAMINOPHEN 5; 325 MG/1; MG/1
1 TABLET ORAL EVERY 6 HOURS PRN
Qty: 25 TABLET | Refills: 0 | Status: SHIPPED | OUTPATIENT
Start: 2023-09-19 | End: 2023-09-26

## 2023-09-19 NOTE — PROGRESS NOTES
quadrant/perieareolar  Ultrasound finding: multiple pockets of fluid appear to be in dilated subareolar milk ducts. Enlarged RIGHT axillary lymph nodes (appear reactive)  Prep : Alcohol. Guidance : Ultrasound guidance. Yield :  5cc of thick yellow fluid was aspirated with an 18 gauge needle from a few pockets in the breast.   The aspirate is NOT malodorous. Effect : some decompression of the abscess  Tolerance: Pt tolerated the procedure with some discomfort  Disposition:  Follow up in 2 days if swelling and pain have not decreased    ASSESSMENT and PLAN   Diagnosis Orders   1. Abscess of breast, right  oxyCODONE-acetaminophen (PERCOCET) 5-325 MG per tablet      Total time spent on chart review and patient visit: 30 minutes     RIGHT breast abscess or mastitis. She does not have an isolated pocket of fluid which is typical of a breast abscess.   She will start doxycycline  Follow up 2 days if swelling and pain do not decrease

## 2023-09-21 ENCOUNTER — TELEPHONE (OUTPATIENT)
Age: 33
End: 2023-09-21

## 2023-09-21 ENCOUNTER — OFFICE VISIT (OUTPATIENT)
Age: 33
End: 2023-09-21

## 2023-09-21 ENCOUNTER — PREP FOR PROCEDURE (OUTPATIENT)
Age: 33
End: 2023-09-21

## 2023-09-21 VITALS — BODY MASS INDEX: 27.62 KG/M2 | HEIGHT: 59 IN | WEIGHT: 137 LBS

## 2023-09-21 DIAGNOSIS — N61.1 BREAST ABSCESS: Primary | ICD-10-CM

## 2023-09-21 DIAGNOSIS — N61.1 ABSCESS OF BREAST: Primary | ICD-10-CM

## 2023-09-21 NOTE — PROGRESS NOTES
HISTORY OF PRESENT ILLNESS  Manuel Mendoza is a 26 y.o. female     HPI ESTABLISHED Patient here for follow up RIGHT breast abscess. Slight improvement in the RIGHT breast, but the breast feels hard in different areas. Still having a lot of pain and can slightly raise her arm higher. 23- RIGHT breast abscess- 5cc aspiration, doxycyline. Past Medical History:   Diagnosis Date    Anemia     Hypertension      Past Surgical History:   Procedure Laterality Date     SECTION      ENDOMETRIAL ABLATION          EYE SURGERY      LASIK      OB History    No obstetric history on file. Obstetric Comments   Menarche 13, LMP , # of children 1, age of 4st delivery 26, Hysterectomy/oophorectomy No/No, Breast bx No, history of breast feeding Yes, BCP No, Hormone therapy NO              Family History   Problem Relation Age of Onset    Hypertension Mother     Diabetes Mother     High Blood Pressure Mother     Allergies Brother      Social History     Tobacco Use    Smoking status: Never    Smokeless tobacco: Never   Substance Use Topics    Alcohol use: Yes     Comment: occasional      Prior to Admission medications    Medication Sig Start Date End Date Taking? Authorizing Provider   oxyCODONE-acetaminophen (PERCOCET) 5-325 MG per tablet Take 1 tablet by mouth every 6 hours as needed for Pain for up to 7 days. Intended supply: 3 days. Take lowest dose possible to manage pain Max Daily Amount: 4 tablets 23 Yes Manjinder Naylor MD   triamterene-hydroCHLOROthiazide [facility]) 37.5-25 MG per tablet Take 1 tablet by mouth every morning 23  Yes Historical Provider, MD      No Known Allergies         Review of Systems      Physical Exam  Cardiovascular:      Rate and Rhythm: Normal rate and regular rhythm. Pulmonary:      Effort: Pulmonary effort is normal.      Breath sounds: Normal breath sounds. Chest:   Breasts:     Right: Swelling, mass and tenderness present. No skin change.

## 2023-09-21 NOTE — H&P (VIEW-ONLY)
HISTORY OF PRESENT ILLNESS  Gene Marquis is a 26 y.o. female     HPI ESTABLISHED Patient here for follow up RIGHT breast abscess. Slight improvement in the RIGHT breast, but the breast feels hard in different areas. Still having a lot of pain and can slightly raise her arm higher. 23- RIGHT breast abscess- 5cc aspiration, doxycyline. Past Medical History:   Diagnosis Date    Anemia     Hypertension      Past Surgical History:   Procedure Laterality Date     SECTION      ENDOMETRIAL ABLATION          EYE SURGERY      LASIK      OB History    No obstetric history on file. Obstetric Comments   Menarche 13, LMP , # of children 1, age of 4st delivery 26, Hysterectomy/oophorectomy No/No, Breast bx No, history of breast feeding Yes, BCP No, Hormone therapy NO              Family History   Problem Relation Age of Onset    Hypertension Mother     Diabetes Mother     High Blood Pressure Mother     Allergies Brother      Social History     Tobacco Use    Smoking status: Never    Smokeless tobacco: Never   Substance Use Topics    Alcohol use: Yes     Comment: occasional      Prior to Admission medications    Medication Sig Start Date End Date Taking? Authorizing Provider   oxyCODONE-acetaminophen (PERCOCET) 5-325 MG per tablet Take 1 tablet by mouth every 6 hours as needed for Pain for up to 7 days. Intended supply: 3 days. Take lowest dose possible to manage pain Max Daily Amount: 4 tablets 23 Yes Alexus Lovell MD   triamterene-hydroCHLOROthiazide [facility]) 37.5-25 MG per tablet Take 1 tablet by mouth every morning 23  Yes Historical Provider, MD      No Known Allergies         Review of Systems      Physical Exam  Cardiovascular:      Rate and Rhythm: Normal rate and regular rhythm. Pulmonary:      Effort: Pulmonary effort is normal.      Breath sounds: Normal breath sounds. Chest:   Breasts:     Right: Swelling, mass and tenderness present. No skin change.

## 2023-09-22 RX ORDER — M-VIT,TX,IRON,MINS/CALC/FOLIC 27MG-0.4MG
1 TABLET ORAL EVERY MORNING
COMMUNITY

## 2023-09-26 ENCOUNTER — TELEPHONE (OUTPATIENT)
Age: 33
End: 2023-09-26

## 2023-09-26 NOTE — TELEPHONE ENCOUNTER
Patient Surgery Information Sheet      Patient Name:  Deepali Heller  Surgery Date:  September 27, 2023    Type of Surgery:  RIGHT BREAST ABSCESS INCISION AND DRAINAGE    Estimated arrival time 7:30AM    Arrival time will be confirmed the afternoon before your surgery. Pre-procedure: Not Applicable    Pre-Operative Testing Department will call to schedule pre-op testing appointment if needed before surgery    [facility]  Address:  [facility]. [place] [address]  Check in location:  Through the Main Entrance of [facility], Take the elevator on the left side to the second floor on your left as you step out of the elevator    Pre-Operative Instructions: Will be given at the pre-op appointment.     Special Instructions if needed:     NPO (nothing by mouth) or drinking after midnight the night before Surgery  Patient may shower the morning of, do not use an lotion, deodorant, powders, perfumes or makeup  Patient will need  the morning of surgery     Surgery Scheduler:  Ronny Stevens

## 2023-09-27 ENCOUNTER — ANESTHESIA (OUTPATIENT)
Facility: HOSPITAL | Age: 33
End: 2023-09-27
Payer: OTHER GOVERNMENT

## 2023-09-27 ENCOUNTER — HOSPITAL ENCOUNTER (OUTPATIENT)
Facility: HOSPITAL | Age: 33
Setting detail: OUTPATIENT SURGERY
Discharge: HOME | End: 2023-09-27
Attending: SURGERY | Admitting: SURGERY
Payer: OTHER GOVERNMENT

## 2023-09-27 ENCOUNTER — ANESTHESIA EVENT (OUTPATIENT)
Facility: HOSPITAL | Age: 33
End: 2023-09-27
Payer: OTHER GOVERNMENT

## 2023-09-27 VITALS
HEART RATE: 89 BPM | DIASTOLIC BLOOD PRESSURE: 81 MMHG | SYSTOLIC BLOOD PRESSURE: 118 MMHG | OXYGEN SATURATION: 97 % | BODY MASS INDEX: 27.38 KG/M2 | WEIGHT: 135.8 LBS | HEIGHT: 59 IN | RESPIRATION RATE: 17 BRPM | TEMPERATURE: 98.1 F

## 2023-09-27 DIAGNOSIS — N61.1 ABSCESS OF BREAST: ICD-10-CM

## 2023-09-27 LAB — HCG UR QL: NEGATIVE

## 2023-09-27 PROCEDURE — 3700000000 HC ANESTHESIA ATTENDED CARE: Performed by: SURGERY

## 2023-09-27 PROCEDURE — 2500000003 HC RX 250 WO HCPCS: Performed by: NURSE ANESTHETIST, CERTIFIED REGISTERED

## 2023-09-27 PROCEDURE — 6360000002 HC RX W HCPCS: Performed by: ANESTHESIOLOGY

## 2023-09-27 PROCEDURE — 7100000001 HC PACU RECOVERY - ADDTL 15 MIN: Performed by: SURGERY

## 2023-09-27 PROCEDURE — 2580000003 HC RX 258: Performed by: ANESTHESIOLOGY

## 2023-09-27 PROCEDURE — 87070 CULTURE OTHR SPECIMN AEROBIC: CPT

## 2023-09-27 PROCEDURE — 3600000002 HC SURGERY LEVEL 2 BASE: Performed by: SURGERY

## 2023-09-27 PROCEDURE — 87075 CULTR BACTERIA EXCEPT BLOOD: CPT

## 2023-09-27 PROCEDURE — 6360000002 HC RX W HCPCS: Performed by: NURSE ANESTHETIST, CERTIFIED REGISTERED

## 2023-09-27 PROCEDURE — 7100000010 HC PHASE II RECOVERY - FIRST 15 MIN: Performed by: SURGERY

## 2023-09-27 PROCEDURE — 3700000001 HC ADD 15 MINUTES (ANESTHESIA): Performed by: SURGERY

## 2023-09-27 PROCEDURE — 6370000000 HC RX 637 (ALT 250 FOR IP): Performed by: ANESTHESIOLOGY

## 2023-09-27 PROCEDURE — 7100000000 HC PACU RECOVERY - FIRST 15 MIN: Performed by: SURGERY

## 2023-09-27 PROCEDURE — 2709999900 HC NON-CHARGEABLE SUPPLY: Performed by: SURGERY

## 2023-09-27 PROCEDURE — 7100000011 HC PHASE II RECOVERY - ADDTL 15 MIN: Performed by: SURGERY

## 2023-09-27 PROCEDURE — 87205 SMEAR GRAM STAIN: CPT

## 2023-09-27 PROCEDURE — 3600000012 HC SURGERY LEVEL 2 ADDTL 15MIN: Performed by: SURGERY

## 2023-09-27 PROCEDURE — 10060 I&D ABSCESS SIMPLE/SINGLE: CPT | Performed by: SURGERY

## 2023-09-27 PROCEDURE — 81025 URINE PREGNANCY TEST: CPT

## 2023-09-27 PROCEDURE — 2500000003 HC RX 250 WO HCPCS: Performed by: SURGERY

## 2023-09-27 RX ORDER — DEXMEDETOMIDINE HYDROCHLORIDE 100 UG/ML
INJECTION, SOLUTION INTRAVENOUS PRN
Status: DISCONTINUED | OUTPATIENT
Start: 2023-09-27 | End: 2023-09-27 | Stop reason: SDUPTHER

## 2023-09-27 RX ORDER — CEFAZOLIN SODIUM 1 G/3ML
INJECTION, POWDER, FOR SOLUTION INTRAMUSCULAR; INTRAVENOUS PRN
Status: DISCONTINUED | OUTPATIENT
Start: 2023-09-27 | End: 2023-09-27 | Stop reason: SDUPTHER

## 2023-09-27 RX ORDER — LIDOCAINE HYDROCHLORIDE 10 MG/ML
1 INJECTION, SOLUTION EPIDURAL; INFILTRATION; INTRACAUDAL; PERINEURAL
Status: DISCONTINUED | OUTPATIENT
Start: 2023-09-27 | End: 2023-09-27 | Stop reason: HOSPADM

## 2023-09-27 RX ORDER — FENTANYL CITRATE 50 UG/ML
INJECTION, SOLUTION INTRAMUSCULAR; INTRAVENOUS PRN
Status: DISCONTINUED | OUTPATIENT
Start: 2023-09-27 | End: 2023-09-27 | Stop reason: SDUPTHER

## 2023-09-27 RX ORDER — SODIUM CHLORIDE, SODIUM LACTATE, POTASSIUM CHLORIDE, CALCIUM CHLORIDE 600; 310; 30; 20 MG/100ML; MG/100ML; MG/100ML; MG/100ML
INJECTION, SOLUTION INTRAVENOUS CONTINUOUS
Status: DISCONTINUED | OUTPATIENT
Start: 2023-09-27 | End: 2023-09-27 | Stop reason: HOSPADM

## 2023-09-27 RX ORDER — OXYCODONE HYDROCHLORIDE AND ACETAMINOPHEN 5; 325 MG/1; MG/1
1 TABLET ORAL EVERY 4 HOURS PRN
COMMUNITY

## 2023-09-27 RX ORDER — ONDANSETRON 2 MG/ML
INJECTION INTRAMUSCULAR; INTRAVENOUS PRN
Status: DISCONTINUED | OUTPATIENT
Start: 2023-09-27 | End: 2023-09-27 | Stop reason: SDUPTHER

## 2023-09-27 RX ORDER — MIDAZOLAM HYDROCHLORIDE 1 MG/ML
INJECTION INTRAMUSCULAR; INTRAVENOUS PRN
Status: DISCONTINUED | OUTPATIENT
Start: 2023-09-27 | End: 2023-09-27 | Stop reason: SDUPTHER

## 2023-09-27 RX ORDER — LABETALOL HYDROCHLORIDE 5 MG/ML
10 INJECTION, SOLUTION INTRAVENOUS
Status: DISCONTINUED | OUTPATIENT
Start: 2023-09-27 | End: 2023-09-27 | Stop reason: HOSPADM

## 2023-09-27 RX ORDER — BUPIVACAINE HYDROCHLORIDE AND EPINEPHRINE 5; 5 MG/ML; UG/ML
INJECTION, SOLUTION PERINEURAL PRN
Status: DISCONTINUED | OUTPATIENT
Start: 2023-09-27 | End: 2023-09-27 | Stop reason: HOSPADM

## 2023-09-27 RX ORDER — ONDANSETRON 2 MG/ML
4 INJECTION INTRAMUSCULAR; INTRAVENOUS
Status: DISCONTINUED | OUTPATIENT
Start: 2023-09-27 | End: 2023-09-27 | Stop reason: HOSPADM

## 2023-09-27 RX ORDER — MEPERIDINE HYDROCHLORIDE 25 MG/ML
12.5 INJECTION INTRAMUSCULAR; INTRAVENOUS; SUBCUTANEOUS EVERY 5 MIN PRN
Status: DISCONTINUED | OUTPATIENT
Start: 2023-09-27 | End: 2023-09-27 | Stop reason: HOSPADM

## 2023-09-27 RX ORDER — PROPOFOL 10 MG/ML
INJECTION, EMULSION INTRAVENOUS CONTINUOUS PRN
Status: DISCONTINUED | OUTPATIENT
Start: 2023-09-27 | End: 2023-09-27 | Stop reason: SDUPTHER

## 2023-09-27 RX ORDER — FAMOTIDINE 10 MG/ML
INJECTION, SOLUTION INTRAVENOUS PRN
Status: DISCONTINUED | OUTPATIENT
Start: 2023-09-27 | End: 2023-09-27 | Stop reason: SDUPTHER

## 2023-09-27 RX ORDER — DIPHENHYDRAMINE HYDROCHLORIDE 50 MG/ML
12.5 INJECTION INTRAMUSCULAR; INTRAVENOUS
Status: DISCONTINUED | OUTPATIENT
Start: 2023-09-27 | End: 2023-09-27 | Stop reason: HOSPADM

## 2023-09-27 RX ORDER — ACETAMINOPHEN 325 MG/1
650 TABLET ORAL ONCE
Status: COMPLETED | OUTPATIENT
Start: 2023-09-27 | End: 2023-09-27

## 2023-09-27 RX ORDER — DROPERIDOL 2.5 MG/ML
0.62 INJECTION, SOLUTION INTRAMUSCULAR; INTRAVENOUS
Status: DISCONTINUED | OUTPATIENT
Start: 2023-09-27 | End: 2023-09-27 | Stop reason: HOSPADM

## 2023-09-27 RX ORDER — GLYCOPYRROLATE 0.2 MG/ML
INJECTION INTRAMUSCULAR; INTRAVENOUS PRN
Status: DISCONTINUED | OUTPATIENT
Start: 2023-09-27 | End: 2023-09-27 | Stop reason: SDUPTHER

## 2023-09-27 RX ADMIN — HYDROMORPHONE HYDROCHLORIDE 0.5 MG: 1 INJECTION, SOLUTION INTRAMUSCULAR; INTRAVENOUS; SUBCUTANEOUS at 10:40

## 2023-09-27 RX ADMIN — SODIUM CHLORIDE, POTASSIUM CHLORIDE, SODIUM LACTATE AND CALCIUM CHLORIDE: 600; 310; 30; 20 INJECTION, SOLUTION INTRAVENOUS at 08:33

## 2023-09-27 RX ADMIN — DEXMEDETOMIDINE 6 MCG: 100 INJECTION, SOLUTION INTRAVENOUS at 09:45

## 2023-09-27 RX ADMIN — ACETAMINOPHEN 650 MG: 325 TABLET ORAL at 08:36

## 2023-09-27 RX ADMIN — PROPOFOL 150 MCG/KG/MIN: 10 INJECTION, EMULSION INTRAVENOUS at 09:45

## 2023-09-27 RX ADMIN — CEFAZOLIN SODIUM 2 G: 1 POWDER, FOR SOLUTION INTRAMUSCULAR; INTRAVENOUS at 09:52

## 2023-09-27 RX ADMIN — FENTANYL CITRATE 50 MCG: 50 INJECTION, SOLUTION INTRAMUSCULAR; INTRAVENOUS at 09:37

## 2023-09-27 RX ADMIN — GLYCOPYRROLATE 0.1 MG: 0.2 INJECTION INTRAMUSCULAR; INTRAVENOUS at 10:00

## 2023-09-27 RX ADMIN — DEXMEDETOMIDINE 6 MCG: 100 INJECTION, SOLUTION INTRAVENOUS at 09:54

## 2023-09-27 RX ADMIN — FAMOTIDINE 20 MG: 10 INJECTION, SOLUTION INTRAVENOUS at 09:37

## 2023-09-27 RX ADMIN — MIDAZOLAM HYDROCHLORIDE 2 MG: 1 INJECTION, SOLUTION INTRAMUSCULAR; INTRAVENOUS at 09:37

## 2023-09-27 RX ADMIN — DEXMEDETOMIDINE 6 MCG: 100 INJECTION, SOLUTION INTRAVENOUS at 09:37

## 2023-09-27 RX ADMIN — ONDANSETRON 4 MG: 2 INJECTION INTRAMUSCULAR; INTRAVENOUS at 09:37

## 2023-09-27 RX ADMIN — FENTANYL CITRATE 50 MCG: 50 INJECTION, SOLUTION INTRAMUSCULAR; INTRAVENOUS at 09:58

## 2023-09-27 RX ADMIN — PROPOFOL 175 MCG/KG/MIN: 10 INJECTION, EMULSION INTRAVENOUS at 09:37

## 2023-09-27 ASSESSMENT — PAIN SCALES - GENERAL
PAINLEVEL_OUTOF10: 0
PAINLEVEL_OUTOF10: 7

## 2023-09-27 ASSESSMENT — PAIN DESCRIPTION - ORIENTATION: ORIENTATION: RIGHT;LOWER

## 2023-09-27 ASSESSMENT — PAIN DESCRIPTION - DESCRIPTORS: DESCRIPTORS: SHARP

## 2023-09-27 ASSESSMENT — PAIN DESCRIPTION - LOCATION: LOCATION: BREAST

## 2023-09-27 ASSESSMENT — PAIN - FUNCTIONAL ASSESSMENT: PAIN_FUNCTIONAL_ASSESSMENT: 0-10

## 2023-09-27 NOTE — ANESTHESIA POSTPROCEDURE EVALUATION
Department of Anesthesiology  Postprocedure Note    Patient: Josi Mathis  MRN: [id number]  YOB: 1990  Date of evaluation: 9/27/2023      Procedure Summary     Date: 09/27/23 Room / Location: [facility] ASU OR  / [facility] AMBULATORY OR    Anesthesia Start: 1379 Anesthesia Stop: 1022    Procedure: RIGHT BREAST ABSCESS INCISION AND DRAINAGE (Right: Breast) Diagnosis:       Breast abscess      (Breast abscess [N61.1])    Surgeons: Sheila Cantu MD Responsible Provider: Frankie Isaac DO    Anesthesia Type: MAC ASA Status: 2          Anesthesia Type: No value filed. Dilcia Phase I: Dilcia Score: 5    Dilcia Phase II:        Anesthesia Post Evaluation    Patient location during evaluation: PACU  Patient participation: complete - patient participated  Level of consciousness: awake and sleepy but conscious  Pain score: 0  Airway patency: patent  Nausea & Vomiting: no vomiting and no nausea  Complications: no  Cardiovascular status: hemodynamically stable  Respiratory status: acceptable  Hydration status: stable  Comments: Patient seen and examined. Ready for discharge from PACU.   Pain management: adequate

## 2023-09-27 NOTE — OP NOTE
Operative Note    [address]  [address] [facility], [address]     Patient: Ne Le  YOB: 1990  MRN: [id number]    Date of Procedure: 9/27/2023    Pre-Op Diagnosis Codes:     * Breast abscess [N61.1]    Post-Op Diagnosis: Same       Procedure(s):  RIGHT BREAST ABSCESS INCISION AND DRAINAGE    Surgeon(s):  Demetri Matson MD    Assistant:   Surgical Assistant: Maci Hardin    Anesthesia: Monitor Anesthesia Care    Estimated Blood Loss (mL): less than 50     Complications: None    Specimens:   ID Type Source Tests Collected by Time Destination   1 : RIGHT BREAST ABCESS Swab Breast CULTURE, ANAEROBIC, CULTURE, WOUND Demetri Matson MD 9/27/2023 1002        Implants:  * No implants in log *      Drains: * No LDAs found *    Findings: Exudate. No odor        Detailed Description of Procedure:   Pt was brought into the operating room and placed on the table in a supine position. She was sedated with IV sedation. The RIGHT breast was prepped and draped in the usual sterile fashion. O.5% marcaine was used for local anesthesia and post op pain relief. Pt had swelling of the lateral breast.  A 2cm incision was made in the UOQ, 1cm from the areola. The abscess cavity was accessed with an incision through the breast tissue. Exudate was suctioned out. The cavity was explored and one large cavity was created to allow all of the exudate to drain. Cultures were obtained. Hemostasis was controlled with electrocautery. The wound was then packed with 1/2 iodoform gauze, approximately 25cm. The wound was dressed with gauze and tape. The patient was awakened and taken to the recovery room. Sponge, needle and instrument counts were reported to be correct.       Electronically signed by Avery Lester MD on 9/27/2023 at 10:21 AM

## 2023-09-27 NOTE — INTERVAL H&P NOTE
Update History & Physical    The patient's History and Physical of September 21, 2023 was reviewed with the patient and I examined the patient. There was no change. The surgical site was confirmed by the patient and me. Plan: The risks, benefits, expected outcome, and alternative to the recommended procedure have been discussed with the patient. Patient understands and wants to proceed with the procedure.      Electronically signed by Rosalia Mueller MD on 9/27/2023 at 9:20 AM

## 2023-09-30 LAB
BACTERIA SPEC CULT: ABNORMAL
BACTERIA SPEC CULT: NORMAL
GRAM STN SPEC: ABNORMAL
SERVICE CMNT-IMP: ABNORMAL
SERVICE CMNT-IMP: NORMAL

## 2023-10-03 ENCOUNTER — OFFICE VISIT (OUTPATIENT)
Age: 33
End: 2023-10-03

## 2023-10-03 VITALS — BODY MASS INDEX: 27.21 KG/M2 | HEIGHT: 59 IN | WEIGHT: 135 LBS

## 2023-10-03 DIAGNOSIS — N61.21 GRANULOMATOUS MASTITIS OF RIGHT BREAST: Primary | ICD-10-CM

## 2023-10-03 PROCEDURE — 99024 POSTOP FOLLOW-UP VISIT: CPT | Performed by: SURGERY

## 2023-10-03 NOTE — PROGRESS NOTES
HISTORY OF PRESENT ILLNESS  Anamaria Hsu is a 26 y.o. female     HPI ESTABLISHED patient POD #5 RIGHT breast I &D for an abscess. The patient is here for packing removal. Her RIGHT breast is hard and very tender, worse than before surgery. Packing still in place, minimal drainage. Review of Systems      Physical Exam  Chest:   Breasts:     Right: Swelling (the lateral breast is hard) and tenderness present. No skin change. Packing removed. Wound dressed  ASSESSMENT and PLAN   Diagnosis Orders   1. Granulomatous mastitis of right breast          Pt has not improved after surgical I and D, and drainage of the breast fluid. This appears to be granulomatous mastitis, not a breast abscess. Symptoms resolve with time, but it can be slow  Antibiotics are not helpful as it is not an infection  Steroids may or may not help. Pt will check in with me next week.

## 2023-10-05 ENCOUNTER — HOSPITAL ENCOUNTER (INPATIENT)
Facility: HOSPITAL | Age: 33
LOS: 1 days | Discharge: HOME | End: 2023-10-06
Attending: EMERGENCY MEDICINE | Admitting: INTERNAL MEDICINE
Payer: OTHER GOVERNMENT

## 2023-10-05 ENCOUNTER — APPOINTMENT (OUTPATIENT)
Facility: HOSPITAL | Age: 33
End: 2023-10-05
Payer: OTHER GOVERNMENT

## 2023-10-05 ENCOUNTER — TELEPHONE (OUTPATIENT)
Age: 33
End: 2023-10-05

## 2023-10-05 DIAGNOSIS — N61.1 ABSCESS OF BREAST: Primary | ICD-10-CM

## 2023-10-05 DIAGNOSIS — N61.21 GRANULOMATOUS MASTITIS OF RIGHT BREAST: Primary | ICD-10-CM

## 2023-10-05 DIAGNOSIS — N61.0 CELLULITIS OF FEMALE BREAST: ICD-10-CM

## 2023-10-05 DIAGNOSIS — R50.9 FEVER, UNSPECIFIED FEVER CAUSE: ICD-10-CM

## 2023-10-05 PROBLEM — L03.90 CELLULITIS: Status: ACTIVE | Noted: 2023-10-05

## 2023-10-05 LAB
ALBUMIN SERPL-MCNC: 3.1 G/DL (ref 3.5–5)
ALBUMIN/GLOB SERPL: 0.6 (ref 1.1–2.2)
ALP SERPL-CCNC: 98 U/L (ref 45–117)
ALT SERPL-CCNC: 28 U/L (ref 12–78)
ANION GAP SERPL CALC-SCNC: 5 MMOL/L (ref 5–15)
AST SERPL-CCNC: 12 U/L (ref 15–37)
BASOPHILS # BLD: 0 K/UL (ref 0–0.1)
BASOPHILS NFR BLD: 0 % (ref 0–1)
BILIRUB SERPL-MCNC: 0.2 MG/DL (ref 0.2–1)
BUN SERPL-MCNC: 10 MG/DL (ref 6–20)
BUN/CREAT SERPL: 12 (ref 12–20)
CALCIUM SERPL-MCNC: 9.2 MG/DL (ref 8.5–10.1)
CHLORIDE SERPL-SCNC: 106 MMOL/L (ref 97–108)
CO2 SERPL-SCNC: 25 MMOL/L (ref 21–32)
COMMENT:: NORMAL
CREAT SERPL-MCNC: 0.82 MG/DL (ref 0.55–1.02)
DIFFERENTIAL METHOD BLD: ABNORMAL
EOSINOPHIL # BLD: 0.2 K/UL (ref 0–0.4)
EOSINOPHIL NFR BLD: 2 % (ref 0–7)
ERYTHROCYTE [DISTWIDTH] IN BLOOD BY AUTOMATED COUNT: 12.4 % (ref 11.5–14.5)
GLOBULIN SER CALC-MCNC: 5 G/DL (ref 2–4)
GLUCOSE BLD STRIP.AUTO-MCNC: 102 MG/DL (ref 65–117)
GLUCOSE SERPL-MCNC: 115 MG/DL (ref 65–100)
HCT VFR BLD AUTO: 33.9 % (ref 35–47)
HGB BLD-MCNC: 11.3 G/DL (ref 11.5–16)
IMM GRANULOCYTES # BLD AUTO: 0.1 K/UL (ref 0–0.04)
IMM GRANULOCYTES NFR BLD AUTO: 1 % (ref 0–0.5)
LACTATE BLD-SCNC: 0.83 MMOL/L (ref 0.4–2)
LYMPHOCYTES # BLD: 2.6 K/UL (ref 0.8–3.5)
LYMPHOCYTES NFR BLD: 18 % (ref 12–49)
MCH RBC QN AUTO: 30.1 PG (ref 26–34)
MCHC RBC AUTO-ENTMCNC: 33.3 G/DL (ref 30–36.5)
MCV RBC AUTO: 90.4 FL (ref 80–99)
MONOCYTES # BLD: 1.5 K/UL (ref 0–1)
MONOCYTES NFR BLD: 10 % (ref 5–13)
NEUTS SEG # BLD: 10 K/UL (ref 1.8–8)
NEUTS SEG NFR BLD: 69 % (ref 32–75)
NRBC # BLD: 0 K/UL (ref 0–0.01)
NRBC BLD-RTO: 0 PER 100 WBC
PLATELET # BLD AUTO: 517 K/UL (ref 150–400)
PMV BLD AUTO: 9.2 FL (ref 8.9–12.9)
POTASSIUM SERPL-SCNC: 3.9 MMOL/L (ref 3.5–5.1)
PROCALCITONIN SERPL-MCNC: <0.05 NG/ML
PROT SERPL-MCNC: 8.1 G/DL (ref 6.4–8.2)
RBC # BLD AUTO: 3.75 M/UL (ref 3.8–5.2)
SERVICE CMNT-IMP: NORMAL
SODIUM SERPL-SCNC: 136 MMOL/L (ref 136–145)
SPECIMEN HOLD: NORMAL
TROPONIN I SERPL HS-MCNC: <4 NG/L (ref 0–51)
WBC # BLD AUTO: 14.5 K/UL (ref 3.6–11)

## 2023-10-05 PROCEDURE — 84484 ASSAY OF TROPONIN QUANT: CPT

## 2023-10-05 PROCEDURE — 99285 EMERGENCY DEPT VISIT HI MDM: CPT

## 2023-10-05 PROCEDURE — 71045 X-RAY EXAM CHEST 1 VIEW: CPT

## 2023-10-05 PROCEDURE — 6370000000 HC RX 637 (ALT 250 FOR IP): Performed by: STUDENT IN AN ORGANIZED HEALTH CARE EDUCATION/TRAINING PROGRAM

## 2023-10-05 PROCEDURE — 83605 ASSAY OF LACTIC ACID: CPT

## 2023-10-05 PROCEDURE — 6360000002 HC RX W HCPCS: Performed by: STUDENT IN AN ORGANIZED HEALTH CARE EDUCATION/TRAINING PROGRAM

## 2023-10-05 PROCEDURE — 96374 THER/PROPH/DIAG INJ IV PUSH: CPT

## 2023-10-05 PROCEDURE — 1100000000 HC RM PRIVATE

## 2023-10-05 PROCEDURE — 2580000003 HC RX 258: Performed by: STUDENT IN AN ORGANIZED HEALTH CARE EDUCATION/TRAINING PROGRAM

## 2023-10-05 PROCEDURE — 84145 PROCALCITONIN (PCT): CPT

## 2023-10-05 PROCEDURE — 80053 COMPREHEN METABOLIC PANEL: CPT

## 2023-10-05 PROCEDURE — 36415 COLL VENOUS BLD VENIPUNCTURE: CPT

## 2023-10-05 PROCEDURE — 87040 BLOOD CULTURE FOR BACTERIA: CPT

## 2023-10-05 PROCEDURE — 82962 GLUCOSE BLOOD TEST: CPT

## 2023-10-05 PROCEDURE — 2500000003 HC RX 250 WO HCPCS: Performed by: STUDENT IN AN ORGANIZED HEALTH CARE EDUCATION/TRAINING PROGRAM

## 2023-10-05 PROCEDURE — 85025 COMPLETE CBC W/AUTO DIFF WBC: CPT

## 2023-10-05 RX ORDER — SODIUM CHLORIDE 0.9 % (FLUSH) 0.9 %
5-40 SYRINGE (ML) INJECTION EVERY 12 HOURS SCHEDULED
Status: DISCONTINUED | OUTPATIENT
Start: 2023-10-05 | End: 2023-10-06 | Stop reason: HOSPADM

## 2023-10-05 RX ORDER — ENOXAPARIN SODIUM 100 MG/ML
40 INJECTION SUBCUTANEOUS DAILY
Status: DISCONTINUED | OUTPATIENT
Start: 2023-10-06 | End: 2023-10-06 | Stop reason: HOSPADM

## 2023-10-05 RX ORDER — SODIUM CHLORIDE 0.9 % (FLUSH) 0.9 %
5-40 SYRINGE (ML) INJECTION PRN
Status: DISCONTINUED | OUTPATIENT
Start: 2023-10-05 | End: 2023-10-06 | Stop reason: HOSPADM

## 2023-10-05 RX ORDER — ONDANSETRON 2 MG/ML
4 INJECTION INTRAMUSCULAR; INTRAVENOUS EVERY 6 HOURS PRN
Status: DISCONTINUED | OUTPATIENT
Start: 2023-10-05 | End: 2023-10-06 | Stop reason: HOSPADM

## 2023-10-05 RX ORDER — ACETAMINOPHEN 500 MG
1000 TABLET ORAL
Status: COMPLETED | OUTPATIENT
Start: 2023-10-05 | End: 2023-10-05

## 2023-10-05 RX ORDER — IBUPROFEN 800 MG/1
800 TABLET ORAL
Status: COMPLETED | OUTPATIENT
Start: 2023-10-05 | End: 2023-10-06

## 2023-10-05 RX ORDER — ONDANSETRON 4 MG/1
4 TABLET, ORALLY DISINTEGRATING ORAL EVERY 8 HOURS PRN
Status: DISCONTINUED | OUTPATIENT
Start: 2023-10-05 | End: 2023-10-06 | Stop reason: HOSPADM

## 2023-10-05 RX ORDER — HYDROMORPHONE HYDROCHLORIDE 1 MG/ML
1 INJECTION, SOLUTION INTRAMUSCULAR; INTRAVENOUS; SUBCUTANEOUS
Status: COMPLETED | OUTPATIENT
Start: 2023-10-05 | End: 2023-10-05

## 2023-10-05 RX ORDER — 0.9 % SODIUM CHLORIDE 0.9 %
878 INTRAVENOUS SOLUTION INTRAVENOUS ONCE
Status: COMPLETED | OUTPATIENT
Start: 2023-10-05 | End: 2023-10-06

## 2023-10-05 RX ORDER — POLYETHYLENE GLYCOL 3350 17 G/17G
17 POWDER, FOR SOLUTION ORAL DAILY PRN
Status: DISCONTINUED | OUTPATIENT
Start: 2023-10-05 | End: 2023-10-06 | Stop reason: HOSPADM

## 2023-10-05 RX ORDER — 0.9 % SODIUM CHLORIDE 0.9 %
1000 INTRAVENOUS SOLUTION INTRAVENOUS ONCE
Status: COMPLETED | OUTPATIENT
Start: 2023-10-05 | End: 2023-10-06

## 2023-10-05 RX ORDER — METHYLPREDNISOLONE 4 MG/1
TABLET ORAL
Qty: 1 KIT | Refills: 0 | Status: SHIPPED | OUTPATIENT
Start: 2023-10-05 | End: 2023-10-11

## 2023-10-05 RX ORDER — ACETAMINOPHEN 325 MG/1
650 TABLET ORAL EVERY 6 HOURS PRN
Status: DISCONTINUED | OUTPATIENT
Start: 2023-10-05 | End: 2023-10-06 | Stop reason: HOSPADM

## 2023-10-05 RX ORDER — SODIUM CHLORIDE 9 MG/ML
INJECTION, SOLUTION INTRAVENOUS PRN
Status: DISCONTINUED | OUTPATIENT
Start: 2023-10-05 | End: 2023-10-06 | Stop reason: HOSPADM

## 2023-10-05 RX ORDER — ACETAMINOPHEN 650 MG/1
650 SUPPOSITORY RECTAL EVERY 6 HOURS PRN
Status: DISCONTINUED | OUTPATIENT
Start: 2023-10-05 | End: 2023-10-06 | Stop reason: HOSPADM

## 2023-10-05 RX ORDER — OXYCODONE AND ACETAMINOPHEN 10; 325 MG/1; MG/1
1 TABLET ORAL EVERY 6 HOURS PRN
Qty: 20 TABLET | Refills: 0 | Status: SHIPPED | OUTPATIENT
Start: 2023-10-05 | End: 2023-11-04

## 2023-10-05 RX ADMIN — ACETAMINOPHEN 1000 MG: 500 TABLET ORAL at 22:56

## 2023-10-05 RX ADMIN — PIPERACILLIN AND TAZOBACTAM 3375 MG: 3; .375 INJECTION, POWDER, LYOPHILIZED, FOR SOLUTION INTRAVENOUS at 23:06

## 2023-10-05 RX ADMIN — HYDROMORPHONE HYDROCHLORIDE 1 MG: 1 INJECTION, SOLUTION INTRAMUSCULAR; INTRAVENOUS; SUBCUTANEOUS at 23:07

## 2023-10-05 RX ADMIN — SODIUM CHLORIDE 1000 ML: 9 INJECTION, SOLUTION INTRAVENOUS at 22:56

## 2023-10-05 ASSESSMENT — PAIN DESCRIPTION - ORIENTATION
ORIENTATION: RIGHT
ORIENTATION: RIGHT

## 2023-10-05 ASSESSMENT — ENCOUNTER SYMPTOMS
COLOR CHANGE: 1
ABDOMINAL DISTENTION: 0
SORE THROAT: 0
ANAL BLEEDING: 0
VOMITING: 0
COUGH: 0
NAUSEA: 0
CONSTIPATION: 0
BACK PAIN: 0
DIARRHEA: 0
BLOOD IN STOOL: 0
ABDOMINAL PAIN: 0
SHORTNESS OF BREATH: 0

## 2023-10-05 ASSESSMENT — PAIN - FUNCTIONAL ASSESSMENT: PAIN_FUNCTIONAL_ASSESSMENT: 0-10

## 2023-10-05 ASSESSMENT — PAIN DESCRIPTION - LOCATION
LOCATION: BREAST
LOCATION: BREAST

## 2023-10-05 ASSESSMENT — PAIN SCALES - GENERAL
PAINLEVEL_OUTOF10: 10
PAINLEVEL_OUTOF10: 10

## 2023-10-05 ASSESSMENT — PAIN DESCRIPTION - PAIN TYPE: TYPE: ACUTE PAIN;SURGICAL PAIN

## 2023-10-05 ASSESSMENT — PAIN DESCRIPTION - DESCRIPTORS: DESCRIPTORS: SHOOTING;SHARP

## 2023-10-05 NOTE — TELEPHONE ENCOUNTER
Pt called  She has chills  Afebrile  Breast is hard and tender    Will try steroids  Abx unlikely to be helpful    Pt is taking motrin during the day. Percocet as needed at night.

## 2023-10-06 ENCOUNTER — TELEPHONE (OUTPATIENT)
Age: 33
End: 2023-10-06

## 2023-10-06 VITALS
HEART RATE: 95 BPM | WEIGHT: 138 LBS | BODY MASS INDEX: 27.82 KG/M2 | OXYGEN SATURATION: 99 % | RESPIRATION RATE: 23 BRPM | TEMPERATURE: 97.9 F | SYSTOLIC BLOOD PRESSURE: 129 MMHG | HEIGHT: 59 IN | DIASTOLIC BLOOD PRESSURE: 89 MMHG

## 2023-10-06 PROBLEM — D72.829 LEUKOCYTOSIS: Status: ACTIVE | Noted: 2023-10-06

## 2023-10-06 PROBLEM — N61.21 GRANULOMATOUS MASTITIS OF RIGHT BREAST: Status: ACTIVE | Noted: 2023-10-06

## 2023-10-06 PROBLEM — I10 HYPERTENSION: Status: ACTIVE | Noted: 2023-10-06

## 2023-10-06 PROBLEM — R00.0 SINUS TACHYCARDIA: Status: ACTIVE | Noted: 2023-10-06

## 2023-10-06 PROBLEM — D64.9 ANEMIA: Status: ACTIVE | Noted: 2023-10-06

## 2023-10-06 PROBLEM — R50.9 FEVER: Status: ACTIVE | Noted: 2023-10-06

## 2023-10-06 PROBLEM — A41.9 SEPSIS (HCC): Status: ACTIVE | Noted: 2023-10-06

## 2023-10-06 LAB
ANION GAP SERPL CALC-SCNC: 5 MMOL/L (ref 5–15)
APPEARANCE UR: ABNORMAL
BACTERIA URNS QL MICRO: ABNORMAL /HPF
BASOPHILS # BLD: 0.1 K/UL (ref 0–0.1)
BASOPHILS NFR BLD: 0 % (ref 0–1)
BILIRUB UR QL: NEGATIVE
BUN SERPL-MCNC: 8 MG/DL (ref 6–20)
BUN/CREAT SERPL: 11 (ref 12–20)
CALCIUM SERPL-MCNC: 8.1 MG/DL (ref 8.5–10.1)
CHLORIDE SERPL-SCNC: 110 MMOL/L (ref 97–108)
CO2 SERPL-SCNC: 21 MMOL/L (ref 21–32)
COLOR UR: ABNORMAL
CREAT SERPL-MCNC: 0.75 MG/DL (ref 0.55–1.02)
DIFFERENTIAL METHOD BLD: ABNORMAL
EOSINOPHIL # BLD: 0.1 K/UL (ref 0–0.4)
EOSINOPHIL NFR BLD: 1 % (ref 0–7)
EPITH CASTS URNS QL MICRO: ABNORMAL /LPF
ERYTHROCYTE [DISTWIDTH] IN BLOOD BY AUTOMATED COUNT: 12.4 % (ref 11.5–14.5)
GLUCOSE SERPL-MCNC: 121 MG/DL (ref 65–100)
GLUCOSE UR STRIP.AUTO-MCNC: NEGATIVE MG/DL
HCT VFR BLD AUTO: 32.8 % (ref 35–47)
HGB BLD-MCNC: 10.8 G/DL (ref 11.5–16)
HGB UR QL STRIP: ABNORMAL
HYALINE CASTS URNS QL MICRO: ABNORMAL /LPF (ref 0–2)
IMM GRANULOCYTES # BLD AUTO: 0.1 K/UL (ref 0–0.04)
IMM GRANULOCYTES NFR BLD AUTO: 1 % (ref 0–0.5)
KETONES UR QL STRIP.AUTO: NEGATIVE MG/DL
LEUKOCYTE ESTERASE UR QL STRIP.AUTO: ABNORMAL
LYMPHOCYTES # BLD: 2.7 K/UL (ref 0.8–3.5)
LYMPHOCYTES NFR BLD: 19 % (ref 12–49)
MCH RBC QN AUTO: 30 PG (ref 26–34)
MCHC RBC AUTO-ENTMCNC: 32.9 G/DL (ref 30–36.5)
MCV RBC AUTO: 91.1 FL (ref 80–99)
MONOCYTES # BLD: 1.4 K/UL (ref 0–1)
MONOCYTES NFR BLD: 10 % (ref 5–13)
NEUTS SEG # BLD: 9.9 K/UL (ref 1.8–8)
NEUTS SEG NFR BLD: 69 % (ref 32–75)
NITRITE UR QL STRIP.AUTO: NEGATIVE
NRBC # BLD: 0 K/UL (ref 0–0.01)
NRBC BLD-RTO: 0 PER 100 WBC
PH UR STRIP: 7 (ref 5–8)
PLATELET # BLD AUTO: 476 K/UL (ref 150–400)
PMV BLD AUTO: 9.2 FL (ref 8.9–12.9)
POTASSIUM SERPL-SCNC: 3.9 MMOL/L (ref 3.5–5.1)
PROT UR STRIP-MCNC: NEGATIVE MG/DL
RBC # BLD AUTO: 3.6 M/UL (ref 3.8–5.2)
RBC #/AREA URNS HPF: ABNORMAL /HPF (ref 0–5)
SODIUM SERPL-SCNC: 136 MMOL/L (ref 136–145)
SP GR UR REFRACTOMETRY: 1.01 (ref 1–1.03)
URINE CULTURE IF INDICATED: ABNORMAL
UROBILINOGEN UR QL STRIP.AUTO: 0.2 EU/DL (ref 0.2–1)
WBC # BLD AUTO: 14.2 K/UL (ref 3.6–11)
WBC URNS QL MICRO: >100 /HPF (ref 0–4)

## 2023-10-06 PROCEDURE — 6370000000 HC RX 637 (ALT 250 FOR IP): Performed by: INTERNAL MEDICINE

## 2023-10-06 PROCEDURE — 87086 URINE CULTURE/COLONY COUNT: CPT

## 2023-10-06 PROCEDURE — 94761 N-INVAS EAR/PLS OXIMETRY MLT: CPT

## 2023-10-06 PROCEDURE — 85025 COMPLETE CBC W/AUTO DIFF WBC: CPT

## 2023-10-06 PROCEDURE — 80048 BASIC METABOLIC PNL TOTAL CA: CPT

## 2023-10-06 PROCEDURE — 87205 SMEAR GRAM STAIN: CPT

## 2023-10-06 PROCEDURE — 2580000003 HC RX 258: Performed by: STUDENT IN AN ORGANIZED HEALTH CARE EDUCATION/TRAINING PROGRAM

## 2023-10-06 PROCEDURE — 87070 CULTURE OTHR SPECIMN AEROBIC: CPT

## 2023-10-06 PROCEDURE — 2580000003 HC RX 258: Performed by: INTERNAL MEDICINE

## 2023-10-06 PROCEDURE — 99282 EMERGENCY DEPT VISIT SF MDM: CPT | Performed by: SURGERY

## 2023-10-06 PROCEDURE — 6360000002 HC RX W HCPCS: Performed by: INTERNAL MEDICINE

## 2023-10-06 PROCEDURE — 6370000000 HC RX 637 (ALT 250 FOR IP): Performed by: STUDENT IN AN ORGANIZED HEALTH CARE EDUCATION/TRAINING PROGRAM

## 2023-10-06 PROCEDURE — 36415 COLL VENOUS BLD VENIPUNCTURE: CPT

## 2023-10-06 PROCEDURE — 81001 URINALYSIS AUTO W/SCOPE: CPT

## 2023-10-06 RX ORDER — POLYETHYLENE GLYCOL 3350 17 G/17G
17 POWDER, FOR SOLUTION ORAL DAILY
Status: DISCONTINUED | OUTPATIENT
Start: 2023-10-06 | End: 2023-10-06 | Stop reason: HOSPADM

## 2023-10-06 RX ORDER — ACETAMINOPHEN 325 MG/1
650 TABLET ORAL EVERY 6 HOURS
Status: DISCONTINUED | OUTPATIENT
Start: 2023-10-06 | End: 2023-10-06 | Stop reason: HOSPADM

## 2023-10-06 RX ORDER — DICLOXACILLIN SODIUM 250 MG/1
250 CAPSULE ORAL 4 TIMES DAILY
Qty: 40 CAPSULE | Refills: 0 | Status: SHIPPED | OUTPATIENT
Start: 2023-10-06 | End: 2023-10-16

## 2023-10-06 RX ORDER — OXYCODONE HYDROCHLORIDE 5 MG/1
10 TABLET ORAL EVERY 4 HOURS PRN
Status: DISCONTINUED | OUTPATIENT
Start: 2023-10-06 | End: 2023-10-06 | Stop reason: HOSPADM

## 2023-10-06 RX ORDER — OXYCODONE HYDROCHLORIDE 5 MG/1
5 TABLET ORAL EVERY 4 HOURS PRN
Status: DISCONTINUED | OUTPATIENT
Start: 2023-10-06 | End: 2023-10-06 | Stop reason: HOSPADM

## 2023-10-06 RX ADMIN — POLYETHYLENE GLYCOL 3350 17 G: 17 POWDER, FOR SOLUTION ORAL at 09:43

## 2023-10-06 RX ADMIN — SODIUM CHLORIDE, PRESERVATIVE FREE 5 ML: 5 INJECTION INTRAVENOUS at 07:05

## 2023-10-06 RX ADMIN — IBUPROFEN 800 MG: 800 TABLET, FILM COATED ORAL at 03:23

## 2023-10-06 RX ADMIN — CEFEPIME 2000 MG: 2 INJECTION, POWDER, FOR SOLUTION INTRAVENOUS at 07:51

## 2023-10-06 RX ADMIN — SODIUM CHLORIDE 878 ML: 9 INJECTION, SOLUTION INTRAVENOUS at 00:21

## 2023-10-06 RX ADMIN — VANCOMYCIN HYDROCHLORIDE 1500 MG: 1.5 INJECTION, POWDER, LYOPHILIZED, FOR SOLUTION INTRAVENOUS at 03:13

## 2023-10-06 RX ADMIN — ACETAMINOPHEN 650 MG: 325 TABLET ORAL at 09:43

## 2023-10-06 ASSESSMENT — PAIN DESCRIPTION - ORIENTATION: ORIENTATION: ANTERIOR

## 2023-10-06 ASSESSMENT — PAIN DESCRIPTION - DESCRIPTORS: DESCRIPTORS: DULL

## 2023-10-06 ASSESSMENT — PAIN SCALES - GENERAL: PAINLEVEL_OUTOF10: 4

## 2023-10-06 ASSESSMENT — PAIN DESCRIPTION - LOCATION: LOCATION: HEAD

## 2023-10-06 NOTE — ED NOTES
RN attempted to call Erlin Wills MD regarding discharge with no response     Steven Simpson RN  10/06/23 8537

## 2023-10-06 NOTE — ED NOTES
Verbal shift change report given to Bhaskar Miller (oncoming nurse) by Pamela GEE (offgoing nurse). Report included the following information ED Encounter Summary and ED SBAR.        [place], [place]  10/06/23 7167

## 2023-10-06 NOTE — DISCHARGE SUMMARY
Discharge Summary    Roseanne Casillas  :  1990  MRN:  [id number]    ADMIT DATE:  10/5/2023  DISCHARGE DATE:  10/6/2023    PRIMARY CARE PHYSICIAN:  Lindsay Saleh MD    VISIT STATUS: Admission    CODE STATUS:  Full Code    DISCHARGE DIAGNOSES:  Principal Problem:    Abscess of breast  Active Problems:    Cellulitis    Leukocytosis    Anemia    Sepsis ([address])    Fever    Sinus tachycardia    Hypertension    Granulomatous mastitis of right breast  Resolved Problems:    * No resolved hospital problems. *      HOSPITAL COURSE:  Pt is POD#9 I and D RIGHT breast abscess. Moderate diptheriods cultured from surgery. Dx'd with granulomatous mastitis. Increasing pain and swelling since surgery. Febrile and elevated WBC yesterday. SIGNIFICANT DIAGNOSTIC STUDIES:  WBC 14.5  T 100.8  CONSULTANTS:  Breast surgery  RECOMMENDED NEXT STEPS:   D/c home on dicloxacillin and medrol dose pack     DISCHARGE MEDICATIONS:         Medication List        ASK your doctor about these medications      DOXYCYCLINE PO     methylPREDNISolone 4 MG tablet  Commonly known as: MEDROL DOSEPACK  Take by mouth. * oxyCODONE-acetaminophen 5-325 MG per tablet  Commonly known as: PERCOCET     * oxyCODONE-acetaminophen  MG per tablet  Commonly known as: Percocet  Take 1 tablet by mouth every 6 hours as needed for Pain for up to 30 days. Intended supply: 30 days Max Daily Amount: 4 tablets     therapeutic multivitamin-minerals tablet     triamterene-hydroCHLOROthiazide 37.5-25 MG per tablet  Commonly known as: MAXZIDE-25           * This list has 2 medication(s) that are the same as other medications prescribed for you. Read the directions carefully, and ask your doctor or other care provider to review them with you. DIET: ADULT DIET; Regular    ACTIVITY: No restriction.   up with assist  ______________________________________________________________________    FOLLOW UP TESTING, PENDING RESULTS OR REFERRALS AT TRANSITIONAL CARE VISIT:   []  Yes    [x]  No    PENDING STUDIES:   none    DISPOSITION: Home    FACILITY/HOME CARE AGENCY NAME: N/A    Follow up with Rashmi Neri MD  [facility] [phone]    Make an appointment to see me in the office Tuesday October 10, 2023.    [phone]    FOLLOW UP QUESTIONS FOR MA/SW:  1. Did you get medications filled and taking them as instructed from discharge? 2. Are you following your discharge instructions from your hospital stay? 3. Please confirm patient is scheduled for a follow up appointment within the above time frame.     DISCHARGE TIME: > 30 minutes    SIGNED:  Osmin Vo MD   10/6/2023, 8:44 AM

## 2023-10-06 NOTE — H&P
Hospitalist Admission Note    NAME:  Deepali Heller   :  1990   MRN:  [id number]     Date/Time:  10/5/2023 11:51 PM    Patient PCP: Amy Mathis MD  ________________________________________________________________________    Given the patient's current clinical presentation, I have a high level of concern for decompensation if discharged from the emergency department. Complex decision making was performed, which includes reviewing the patient's available past medical records, laboratory results, and x-ray films. My assessment of this patient's clinical condition and my plan of care is as follows. Assessment / Plan:    Breast Cellulitis, R  Sepsis poa from r breast  Leukocytosis, fevers  Ddx: granuloma, cellulitis, abscess, less likely nec fasciitis   - Vanc, Cefepime, cs to Gen Surg to eval further  - wound culture, check mrsa nares      I have personally reviewed the radiographs, laboratory data in Epic and decisions and statements above are based partially on this personal interpretation. Code Status: Full Code  DVT Prophylaxis: Lovenox  GI Prophylaxis: not indicated       Subjective:   CHIEF COMPLAINT: \"Breast pain\"    HISTORY OF PRESENT ILLNESS:     Mellissa Miranda is a 26 y.o.  F with pmhx recent I and D of infected R breast who presents to the emergency department with further pain and fevers and drainage from right breast.    The patient reports that she is having fevers, and experiencing significant pain from her right breast.  She had been pulling out the packing to inches per day as directed, but she noticed significant drainage over the last couple days. She feels like this started the day after the surgery. She has no nausea vomiting or diarrhea. Her breast on the right is significantly indurated and tender to palpation. Surgical notes reviewed. Available records were reviewed at the time of H&P.         Past Medical History:   Diagnosis Date    Anemia

## 2023-10-06 NOTE — ED NOTES
RN Roberta Vallejo MD regarding discharge and provider placed discharge order at this time     Padmini Doherty RN  10/06/23 6337

## 2023-10-06 NOTE — ED NOTES
RN Ap Ochoa MD to see if pt is going to be discharged.  No response     Nancye Peabody, RN  10/06/23 1140

## 2023-10-06 NOTE — CONSULTS
POD# 9 RIGHT breast I and D  Moderate diptheriods. Pt has increasing pain, swelling, erythema since surgery. Last night she had chills, came to ER and had an elevated temp and WBC. Treated with IV abx  Prelminary blood cx negative   This morning she is afebrile. WBC still 14.2  She is feeling better, but I think taking more pain medication than she was at home. Home today on abx, medrol dose pack, pain med  Follow up with me in 4 days.

## 2023-10-06 NOTE — ED NOTES
Pt discharged via ambulation in stable condition per provider's order with no further questions at this time.      Brittany Rivers RN  10/06/23 1913

## 2023-10-06 NOTE — ED TRIAGE NOTES
Pt recently had surgery last Wednesday 9/17 to remove infected right breast tissue. Pt reports pain, swelling and has been taking 800mg Ibuprofen. Pt reports talking to her doctor, who told her to go to the emergency room.

## 2023-10-06 NOTE — TELEPHONE ENCOUNTER
Admitted over night  WBC 14.5  T 100.8    Discharged in the morning on steroids, dicoxacillin, pain meds  F/u Tuesday

## 2023-10-06 NOTE — ED NOTES
RN Mohini Dodd MD regarding discharge. No response so RN called provider. Provider informed RN that he was no longer assigned to this patient.      Oleksandr Cherry RN  10/06/23 8746

## 2023-10-06 NOTE — TELEPHONE ENCOUNTER
Patient left message in my chart.  Called patient back to schedule appointment w/Dr. Vandana Pastrana

## 2023-10-07 LAB
BACTERIA SPEC CULT: NORMAL
GRAM STN SPEC: NORMAL
GRAM STN SPEC: NORMAL
SERVICE CMNT-IMP: NORMAL

## 2023-10-08 LAB
BACTERIA SPEC CULT: NORMAL
BACTERIA SPEC CULT: NORMAL
SERVICE CMNT-IMP: NORMAL
SERVICE CMNT-IMP: NORMAL

## 2023-10-10 ENCOUNTER — CLINICAL DOCUMENTATION (OUTPATIENT)
Age: 33
End: 2023-10-10

## 2023-10-10 ENCOUNTER — TELEPHONE (OUTPATIENT)
Facility: CLINIC | Age: 33
End: 2023-10-10

## 2023-10-10 ENCOUNTER — OFFICE VISIT (OUTPATIENT)
Age: 33
End: 2023-10-10
Payer: OTHER GOVERNMENT

## 2023-10-10 ENCOUNTER — HOSPITAL ENCOUNTER (OUTPATIENT)
Facility: HOSPITAL | Age: 33
Setting detail: SPECIMEN
Discharge: HOME | End: 2023-10-13

## 2023-10-10 VITALS — WEIGHT: 138 LBS | BODY MASS INDEX: 27.82 KG/M2 | HEIGHT: 59 IN

## 2023-10-10 DIAGNOSIS — R92.8 ABNORMAL ULTRASOUND OF BREAST: Primary | ICD-10-CM

## 2023-10-10 DIAGNOSIS — N64.4 MASTODYNIA OF RIGHT BREAST: ICD-10-CM

## 2023-10-10 DIAGNOSIS — N63.0 BREAST SWELLING: ICD-10-CM

## 2023-10-10 LAB
BACTERIA SPEC CULT: NORMAL
GRAM STN SPEC: NORMAL
GRAM STN SPEC: NORMAL
SERVICE CMNT-IMP: NORMAL

## 2023-10-10 PROCEDURE — 99213 OFFICE O/P EST LOW 20 MIN: CPT | Performed by: SURGERY

## 2023-10-10 PROCEDURE — 19083 BX BREAST 1ST LESION US IMAG: CPT | Performed by: SURGERY

## 2023-10-10 RX ORDER — IBUPROFEN 800 MG/1
800 TABLET ORAL 2 TIMES DAILY PRN
Qty: 60 TABLET | Refills: 1 | Status: SHIPPED | OUTPATIENT
Start: 2023-10-10

## 2023-10-10 NOTE — PROGRESS NOTES
comment. No evidence for malignancy in the sections examined. Concordance:  YES  ASSESSMENT and PLAN   Diagnosis Orders   1. Abnormal ultrasound of breast  Surgical Pathology      2. Mastodynia of right breast        3. Breast swelling        Total time spent on chart review and patient visit: 20 minutes     RIGHT breast swelling and pain. No improvement with steroids or antibiotics  Biopsy today to assess for breast cancer. If biopsy is negative consider surgical excision of the hard, tender breast tissue. 10/12/2023  Biopsy confirms Granulomatous mastitis. Stains pending for other granulomatous diseases  Pt still has pain.     Will schedule surgical excision

## 2023-10-10 NOTE — PROGRESS NOTES
[facility]   OFFICE PROCEDURE PROGRESS NOTE        Chart reviewed for the following:   I, Dr. Karin Ames, have reviewed the History, Physical and updated the Allergic reactions for [address] performed immediately prior to start of procedure:   I, Dr. Karin Ames, have performed the following reviews on Manuel  prior to the start of the procedure:            * Patient was identified by name and date of birth   * Agreement on procedure being performed was verified  * Risks and Benefits explained to the patient  * Procedure site verified and marked as necessary  * Patient was positioned for comfort  * Consent was signed and verified     Time: 11:50 am      Date of procedure: 10/10/2023    Procedure performed by:  Karin Ames MD    Provider assisted by: David Menezes MA    Patient assisted by:self    How tolerated by patient: tolerated the procedure well with no complications    Post Procedural Pain Scale: 0    Comments: none

## 2023-10-10 NOTE — TELEPHONE ENCOUNTER
Call center called to schedule a follow up appointment for patient. Unable to schedule due to availability. Patient was advised by treating physician to have repeat labs including A1C. Patient has an appointment already scheduled for 11/7.

## 2023-10-12 ENCOUNTER — TELEPHONE (OUTPATIENT)
Age: 33
End: 2023-10-12

## 2023-10-12 DIAGNOSIS — N61.21 GRANULOMATOUS MASTITIS OF RIGHT BREAST: Primary | ICD-10-CM

## 2023-10-12 NOTE — TELEPHONE ENCOUNTER
Breast, right, biopsy:        Granulomatous mastitis. See comment. No evidence for malignancy in the sections examined. Biopsy confirms Granulomatous mastitis.     Pt would like to schedule surgery in 2 weeks as pain has not decreased

## 2023-10-13 LAB
BUN SERPL-MCNC: 13 MG/DL (ref 6–20)
BUN/CREAT SERPL: 19 (ref 9–23)
CALCIUM SERPL-MCNC: 9.1 MG/DL (ref 8.7–10.2)
CHLORIDE SERPL-SCNC: 101 MMOL/L (ref 96–106)
CO2 SERPL-SCNC: 22 MMOL/L (ref 20–29)
CREAT SERPL-MCNC: 0.69 MG/DL (ref 0.57–1)
EGFRCR SERPLBLD CKD-EPI 2021: 116 ML/MIN/1.73
GLUCOSE SERPL-MCNC: 81 MG/DL (ref 70–99)
POTASSIUM SERPL-SCNC: 4.4 MMOL/L (ref 3.5–5.2)
SODIUM SERPL-SCNC: 137 MMOL/L (ref 134–144)

## 2023-10-19 ENCOUNTER — TELEPHONE (OUTPATIENT)
Age: 33
End: 2023-10-19

## 2023-10-23 ENCOUNTER — CLINICAL DOCUMENTATION (OUTPATIENT)
Age: 33
End: 2023-10-23

## 2023-10-23 NOTE — PROGRESS NOTES
I called posting and spoke with Chloe Hand  and cancelled this patient's surgery for  10/25/2023 at [facility]. Patient had emergency surgery this past weekend.

## 2023-10-24 ENCOUNTER — OFFICE VISIT (OUTPATIENT)
Age: 33
End: 2023-10-24

## 2023-10-24 VITALS — HEIGHT: 59 IN | BODY MASS INDEX: 27.82 KG/M2 | WEIGHT: 138 LBS

## 2023-10-24 DIAGNOSIS — S21.001A BREAST WOUND, RIGHT, INITIAL ENCOUNTER: Primary | ICD-10-CM

## 2023-10-24 DIAGNOSIS — N61.21 GRANULOMATOUS MASTITIS OF RIGHT BREAST: ICD-10-CM

## 2023-10-24 PROCEDURE — 99024 POSTOP FOLLOW-UP VISIT: CPT | Performed by: SURGERY

## 2023-10-24 RX ORDER — CEPHALEXIN 500 MG/1
500 CAPSULE ORAL 2 TIMES DAILY
COMMUNITY
Start: 2023-10-09

## 2023-10-24 RX ORDER — AMOXICILLIN AND CLAVULANATE POTASSIUM 875; 125 MG/1; MG/1
1 TABLET, FILM COATED ORAL 2 TIMES DAILY
COMMUNITY
Start: 2023-10-19

## 2023-10-24 NOTE — PROGRESS NOTES
HISTORY OF PRESENT ILLNESS  Henny Abdul is a 26 y.o. female     HPI ESTABLISHED patient here today for follow up RIGHT breast granulomatous mastitis. 1 week ago she had I and D and admitted for IV abx and packing changes. She is feeling much better. The RIGHT breast wound is draining serosanguinous fluid. She is on Keflex and Augmentin. Here with her . Review of Systems      Physical Exam  Chest:   Breasts:     Right: Skin change (2cm open wound) and tenderness present. ASSESSMENT and PLAN   Diagnosis Orders   1. Breast wound, right, initial encounter  [facility]      2.  Granulomatous mastitis of right breast          I and D last week  Feelling much better  Open wound with serous drainage  Pain and swelling are decreasing    Refer to wound care clinic  Follow up if symptoms get worse  Consider steroids if symptoms get worse
03-Oct-2022 12:33

## 2023-11-01 ENCOUNTER — HOSPITAL ENCOUNTER (OUTPATIENT)
Facility: HOSPITAL | Age: 33
Discharge: HOME | End: 2023-11-01
Attending: SPECIALIST
Payer: OTHER GOVERNMENT

## 2023-11-01 VITALS
TEMPERATURE: 99.2 F | DIASTOLIC BLOOD PRESSURE: 68 MMHG | RESPIRATION RATE: 18 BRPM | SYSTOLIC BLOOD PRESSURE: 109 MMHG | HEART RATE: 95 BPM

## 2023-11-01 DIAGNOSIS — S21.001S OPEN WOUND OF RIGHT BREAST, SEQUELA: Primary | ICD-10-CM

## 2023-11-01 DIAGNOSIS — I10 ESSENTIAL HYPERTENSION, BENIGN: ICD-10-CM

## 2023-11-01 PROBLEM — S21.001A OPEN WOUND OF RIGHT BREAST: Status: ACTIVE | Noted: 2023-11-01

## 2023-11-01 PROCEDURE — 11042 DBRDMT SUBQ TIS 1ST 20SQCM/<: CPT

## 2023-11-01 PROCEDURE — 99213 OFFICE O/P EST LOW 20 MIN: CPT

## 2023-11-01 RX ORDER — LIDOCAINE 40 MG/G
CREAM TOPICAL ONCE
Status: CANCELLED | OUTPATIENT
Start: 2023-11-01 | End: 2023-11-01

## 2023-11-01 RX ORDER — SULFAMETHOXAZOLE AND TRIMETHOPRIM 800; 160 MG/1; MG/1
1 TABLET ORAL 2 TIMES DAILY
COMMUNITY

## 2023-11-01 RX ORDER — CLOBETASOL PROPIONATE 0.5 MG/G
OINTMENT TOPICAL ONCE
OUTPATIENT
Start: 2023-11-01 | End: 2023-11-01

## 2023-11-01 RX ORDER — CLOBETASOL PROPIONATE 0.5 MG/G
OINTMENT TOPICAL ONCE
Status: CANCELLED | OUTPATIENT
Start: 2023-11-01 | End: 2023-11-01

## 2023-11-01 RX ORDER — IBUPROFEN 200 MG
TABLET ORAL ONCE
OUTPATIENT
Start: 2023-11-01 | End: 2023-11-01

## 2023-11-01 RX ORDER — BETAMETHASONE DIPROPIONATE 0.05 %
OINTMENT (GRAM) TOPICAL ONCE
OUTPATIENT
Start: 2023-11-01 | End: 2023-11-01

## 2023-11-01 RX ORDER — BACITRACIN ZINC AND POLYMYXIN B SULFATE 500; 1000 [USP'U]/G; [USP'U]/G
OINTMENT TOPICAL ONCE
OUTPATIENT
Start: 2023-11-01 | End: 2023-11-01

## 2023-11-01 RX ORDER — TRIAMCINOLONE ACETONIDE 1 MG/G
OINTMENT TOPICAL ONCE
Status: CANCELLED | OUTPATIENT
Start: 2023-11-01 | End: 2023-11-01

## 2023-11-01 RX ORDER — LIDOCAINE 50 MG/G
OINTMENT TOPICAL ONCE
Status: CANCELLED | OUTPATIENT
Start: 2023-11-01 | End: 2023-11-01

## 2023-11-01 RX ORDER — GINSENG 100 MG
CAPSULE ORAL ONCE
OUTPATIENT
Start: 2023-11-01 | End: 2023-11-01

## 2023-11-01 RX ORDER — LIDOCAINE HYDROCHLORIDE 20 MG/ML
JELLY TOPICAL ONCE
Status: CANCELLED | OUTPATIENT
Start: 2023-11-01 | End: 2023-11-01

## 2023-11-01 RX ORDER — SODIUM CHLOR/HYPOCHLOROUS ACID 0.033 %
SOLUTION, IRRIGATION IRRIGATION ONCE
Status: CANCELLED | OUTPATIENT
Start: 2023-11-01 | End: 2023-11-01

## 2023-11-01 RX ORDER — BETAMETHASONE DIPROPIONATE 0.05 %
OINTMENT (GRAM) TOPICAL ONCE
Status: CANCELLED | OUTPATIENT
Start: 2023-11-01 | End: 2023-11-01

## 2023-11-01 RX ORDER — LIDOCAINE 50 MG/G
OINTMENT TOPICAL ONCE
OUTPATIENT
Start: 2023-11-01 | End: 2023-11-01

## 2023-11-01 RX ORDER — LIDOCAINE 40 MG/G
CREAM TOPICAL ONCE
OUTPATIENT
Start: 2023-11-01 | End: 2023-11-01

## 2023-11-01 RX ORDER — GENTAMICIN SULFATE 1 MG/G
OINTMENT TOPICAL ONCE
OUTPATIENT
Start: 2023-11-01 | End: 2023-11-01

## 2023-11-01 RX ORDER — LIDOCAINE HYDROCHLORIDE 20 MG/ML
JELLY TOPICAL ONCE
OUTPATIENT
Start: 2023-11-01 | End: 2023-11-01

## 2023-11-01 RX ORDER — IBUPROFEN 200 MG
TABLET ORAL ONCE
Status: CANCELLED | OUTPATIENT
Start: 2023-11-01 | End: 2023-11-01

## 2023-11-01 RX ORDER — GENTAMICIN SULFATE 1 MG/G
OINTMENT TOPICAL ONCE
Status: CANCELLED | OUTPATIENT
Start: 2023-11-01 | End: 2023-11-01

## 2023-11-01 RX ORDER — LIDOCAINE HYDROCHLORIDE 40 MG/ML
SOLUTION TOPICAL ONCE
Status: CANCELLED | OUTPATIENT
Start: 2023-11-01 | End: 2023-11-01

## 2023-11-01 RX ORDER — GINSENG 100 MG
CAPSULE ORAL ONCE
Status: CANCELLED | OUTPATIENT
Start: 2023-11-01 | End: 2023-11-01

## 2023-11-01 RX ORDER — TRIAMCINOLONE ACETONIDE 1 MG/G
OINTMENT TOPICAL ONCE
OUTPATIENT
Start: 2023-11-01 | End: 2023-11-01

## 2023-11-01 RX ORDER — BACITRACIN ZINC AND POLYMYXIN B SULFATE 500; 1000 [USP'U]/G; [USP'U]/G
OINTMENT TOPICAL ONCE
Status: CANCELLED | OUTPATIENT
Start: 2023-11-01 | End: 2023-11-01

## 2023-11-01 RX ORDER — SODIUM CHLOR/HYPOCHLOROUS ACID 0.033 %
SOLUTION, IRRIGATION IRRIGATION ONCE
OUTPATIENT
Start: 2023-11-01 | End: 2023-11-01

## 2023-11-01 RX ORDER — LIDOCAINE HYDROCHLORIDE 40 MG/ML
SOLUTION TOPICAL ONCE
OUTPATIENT
Start: 2023-11-01 | End: 2023-11-01

## 2023-11-01 ASSESSMENT — PAIN SCALES - GENERAL: PAINLEVEL_OUTOF10: 4

## 2023-11-01 NOTE — DISCHARGE INSTRUCTIONS
Wound Clinic Physician Orders and Discharge Instructions  [address] [place] [address] S. [address]  Telephone: [phone]    NAME:  Rakan Escamilla OF BIRTH:  1990  MEDICAL RECORD NUMBER:  [id number]  DATE:  11/1/2023      Return Appointment:  [x] Dressing Supply Provider: Aaron  [] Home Healthcare:  [x] Return Appointment:   1   Week(s)  [] Nurse Visit:     [] Discharge from [facility]: [] Healed        [] Refer to Provider:         [] Consult    Follow-up Information:  [] Ordered Tests:   [] Referrals:   [] Rx:   [] Other:       Wound Cleansing:   Do not scrub or use excessive force. Cleanse wound prior to applying a clean dressing with:  [x] Normal Saline   [] Keep Wound Dry in Shower     [] Wound Cleanser   [x] Cleanse wound with Mild Soap & Water    [] Other:       Topical Treatments:  Do not apply lotions, creams, or ointments to wound bed unless directed. [] Apply moisturizing lotion to skin surrounding the wound prior to dressing change.  [] Apply antifungal ointment to skin surrounding the wound prior to dressing change.  [] Apply thin film of moisture barrier ointment to skin immediately around wound.   [] Apply Betadine to skin immediately around wound   [] Other:      Dressings:           Wound Location   RIGHT BREAST  [x] Apply Primary Dressing:       [] MediHoney Gel [] MediHoney Alginate  [] Calcium Alginate with Silver   [] Calcium Alginate without silver   [] Collagen with silver [] Collagen without Silver    [] Santyl with moist saline gauze     [x] Hydrofera Blue (cut to size and moistened with normal saline)  [] Hydrofera Blue Ready (cut to size)      [] Normal Saline wet to dry  [] Betadine wet to dry    [] Hydrogel  [] Mepitel     [] Bactroban/Mupirocin   [] Iodoform Packing Strip [] Plain Packing Strip   [] Skin Sub:   [] Other:      [x] Cover and Secure with:     [x] Gauze [] Kendra [] Kerlix   [] Zetuvit

## 2023-11-01 NOTE — WOUND CARE
[address] [place] and [address] [facility]   Medical Staff Progress Note     303 [facility] RECORD NUMBER:  [id number]  AGE: 26 y.o. GENDER: female  : 1990  EPISODE DATE:  2023    Chief complaint and reason for visit:   R breast wound  Chief Complaint   Patient presents with    Wound Check     Right breast      Patient presenting for follow up evaluation of wound(s) per chief complaint. Subjective: Symptoms, wound related issues, or other pertinent wound history since last visit: 49-year-old nondiabetic female underwent incision and drainage of a right breast abscess on  of this year. She had been treated with antibiotics but the abscess apparently recurred. The patient was started on doxycycline antibiotics, and local wound care. She reports that she is scheduled to return to the operating room in the near future for further drainage of right breast abscess. The wound is very painful. There is purulent drainage reported. There is no history of fever.     Wound 23 Breast Right #1 (Active)   Wound Image   23   Wound Etiology Other 23   Dressing Status Old drainage noted 23   Wound Cleansed Cleansed with saline 23   Wound Length (cm) 2.6 cm 23   Wound Width (cm) 1.1 cm 23   Wound Depth (cm) 2.9 cm 23   Wound Surface Area (cm^2) 2.86 cm^2 23   Wound Volume (cm^3) 8.294 cm^3 23   Post-Procedure Length (cm) 2.6 cm 23   Post-Procedure Width (cm) 1.1 cm 23   Post-Procedure Depth (cm) 2.9 cm 23   Post-Procedure Surface Area (cm^2) 2.86 cm^2 23   Post-Procedure Volume (cm^3) 8.294 cm^3 23   Distance Tunneling (cm) 2.6 cm 23   Tunneling Position ___ O'Clock 10 23   Wound Assessment Granulation tissue;Slough 23   Drainage Amount Moderate (25-50%)

## 2023-11-01 NOTE — WOUND CARE
Wound Clinic Physician Orders and Discharge Instructions  [address] [place] [address] S. [address]  Telephone: [phone]    NAME:  Deyanira Hare OF BIRTH:  1990  MEDICAL RECORD NUMBER:  [id number]  DATE:  11/1/2023      Return Appointment:  [x] Dressing Supply Provider: Aaron  [] Home Healthcare:  [x] Return Appointment:   1   Week(s)  [] Nurse Visit:     [] Discharge from [facility]: [] Healed        [] Refer to Provider:         [] Consult    Follow-up Information:  [] Ordered Tests:   [] Referrals:   [] Rx:   [] Other:       Wound Cleansing:   Do not scrub or use excessive force. Cleanse wound prior to applying a clean dressing with:  [x] Normal Saline   [] Keep Wound Dry in Shower     [] Wound Cleanser   [x] Cleanse wound with Mild Soap & Water    [] Other:       Topical Treatments:  Do not apply lotions, creams, or ointments to wound bed unless directed. [] Apply moisturizing lotion to skin surrounding the wound prior to dressing change.  [] Apply antifungal ointment to skin surrounding the wound prior to dressing change.  [] Apply thin film of moisture barrier ointment to skin immediately around wound.   [] Apply Betadine to skin immediately around wound   [] Other:      Dressings:           Wound Location   RIGHT BREAST  [x] Apply Primary Dressing:       [] MediHoney Gel [] MediHoney Alginate  [] Calcium Alginate with Silver   [] Calcium Alginate without silver   [] Collagen with silver [] Collagen without Silver    [] Santyl with moist saline gauze     [x] Hydrofera Blue (cut to size and moistened with normal saline)  [] Hydrofera Blue Ready (cut to size)      [] Normal Saline wet to dry  [] Betadine wet to dry    [] Hydrogel  [] Mepitel     [] Bactroban/Mupirocin   [] Iodoform Packing Strip [] Plain Packing Strip   [] Skin Sub:   [] Other:      [x] Cover and Secure with:     [x] Gauze [] Kendra [] Kerlix   [] Zetuvit

## 2023-11-01 NOTE — WOUND CARE
[address] [place] [address]:     400 [facility] [address] [place] [address] f: [phone] f: [phone] p: [phone] [email or web address]      Ordering Center:     [address] [place], [place] [address] [address]    Phone: [phone]  Fax: [phone]    Patient Information:      Areli Blackwell  [address] [address]   [phone]   : 1990  AGE: 26 y.o. GENDER: female   EPISODE DATE: 2023    Insurance:      PRIMARY INSURANCE:  Plan:  EAST  Coverage:  EAST  Effective Date: 2023  Group Number: [unfilled]  Subscriber Number: [id number] - (Other)    Payer/Plan Subscr  Sex Relation Sub.  Ins. ID Effective Group Num   1.  EAST * NANDA NEWMAN 3/22/1989 Male Spouse [id number]                                    [address]       Patient Wound Information:      Problem List Items Addressed This Visit          Other    Open wound of right breast - Primary    Relevant Orders    Initiate Outpatient Wound Care Protocol       WOUNDS REQUIRING DRESSING SUPPLIES:     Wound 23 Breast Right #1 (Active)   Wound Image   23   Wound Etiology Other 23   Dressing Status Old drainage noted 23   Wound Cleansed Cleansed with saline 23   Wound Length (cm) 2.6 cm 23   Wound Width (cm) 1.1 cm 23   Wound Depth (cm) 2.9 cm 23   Wound Surface Area (cm^2) 2.86 cm^2 23   Wound Volume (cm^3) 8.294 cm^3 23   Post-Procedure Length (cm) 2.6 cm 23   Post-Procedure Width (cm) 1.1 cm 23   Post-Procedure Depth (cm) 2.9 cm 23   Post-Procedure Surface Area (cm^2) 2.86 cm^2 23   Post-Procedure Volume (cm^3) 8.294 cm^3 23   Distance Tunneling (cm) 2.6 cm 23   Tunneling Position ___ O'Clock 10 23   Wound Assessment

## 2023-11-07 ENCOUNTER — OFFICE VISIT (OUTPATIENT)
Facility: CLINIC | Age: 33
End: 2023-11-07
Payer: OTHER GOVERNMENT

## 2023-11-07 VITALS
WEIGHT: 138 LBS | TEMPERATURE: 98.9 F | OXYGEN SATURATION: 99 % | HEIGHT: 59 IN | DIASTOLIC BLOOD PRESSURE: 70 MMHG | HEART RATE: 86 BPM | RESPIRATION RATE: 16 BRPM | BODY MASS INDEX: 27.82 KG/M2 | SYSTOLIC BLOOD PRESSURE: 109 MMHG

## 2023-11-07 DIAGNOSIS — N61.21 GRANULOMATOUS MASTITIS OF RIGHT BREAST: Primary | ICD-10-CM

## 2023-11-07 DIAGNOSIS — I10 ESSENTIAL HYPERTENSION, BENIGN: ICD-10-CM

## 2023-11-07 DIAGNOSIS — D64.9 ACUTE ANEMIA: ICD-10-CM

## 2023-11-07 PROCEDURE — 3074F SYST BP LT 130 MM HG: CPT | Performed by: INTERNAL MEDICINE

## 2023-11-07 PROCEDURE — 99214 OFFICE O/P EST MOD 30 MIN: CPT | Performed by: INTERNAL MEDICINE

## 2023-11-07 PROCEDURE — 3078F DIAST BP <80 MM HG: CPT | Performed by: INTERNAL MEDICINE

## 2023-11-07 ASSESSMENT — ENCOUNTER SYMPTOMS
COUGH: 0
DIARRHEA: 0
VOMITING: 0
SHORTNESS OF BREATH: 0
NAUSEA: 0
ABDOMINAL PAIN: 0

## 2023-11-15 ENCOUNTER — HOSPITAL ENCOUNTER (OUTPATIENT)
Facility: HOSPITAL | Age: 33
Discharge: HOME | End: 2023-11-15
Attending: SPECIALIST
Payer: OTHER GOVERNMENT

## 2023-11-15 VITALS
SYSTOLIC BLOOD PRESSURE: 113 MMHG | HEART RATE: 92 BPM | RESPIRATION RATE: 18 BRPM | DIASTOLIC BLOOD PRESSURE: 67 MMHG | TEMPERATURE: 97.8 F

## 2023-11-15 DIAGNOSIS — S21.001S OPEN WOUND OF RIGHT BREAST, SEQUELA: Primary | ICD-10-CM

## 2023-11-15 PROCEDURE — 99213 OFFICE O/P EST LOW 20 MIN: CPT

## 2023-11-15 RX ORDER — GINSENG 100 MG
CAPSULE ORAL ONCE
OUTPATIENT
Start: 2023-11-15 | End: 2023-11-15

## 2023-11-15 RX ORDER — LIDOCAINE HYDROCHLORIDE 20 MG/ML
JELLY TOPICAL ONCE
OUTPATIENT
Start: 2023-11-15 | End: 2023-11-15

## 2023-11-15 RX ORDER — LIDOCAINE 40 MG/G
CREAM TOPICAL ONCE
OUTPATIENT
Start: 2023-11-15 | End: 2023-11-15

## 2023-11-15 RX ORDER — BACITRACIN ZINC AND POLYMYXIN B SULFATE 500; 1000 [USP'U]/G; [USP'U]/G
OINTMENT TOPICAL ONCE
OUTPATIENT
Start: 2023-11-15 | End: 2023-11-15

## 2023-11-15 RX ORDER — LIDOCAINE HYDROCHLORIDE 40 MG/ML
SOLUTION TOPICAL ONCE
OUTPATIENT
Start: 2023-11-15 | End: 2023-11-15

## 2023-11-15 RX ORDER — LIDOCAINE 50 MG/G
OINTMENT TOPICAL ONCE
OUTPATIENT
Start: 2023-11-15 | End: 2023-11-15

## 2023-11-15 RX ORDER — BETAMETHASONE DIPROPIONATE 0.05 %
OINTMENT (GRAM) TOPICAL ONCE
OUTPATIENT
Start: 2023-11-15 | End: 2023-11-15

## 2023-11-15 RX ORDER — CLOBETASOL PROPIONATE 0.5 MG/G
OINTMENT TOPICAL ONCE
OUTPATIENT
Start: 2023-11-15 | End: 2023-11-15

## 2023-11-15 RX ORDER — GENTAMICIN SULFATE 1 MG/G
OINTMENT TOPICAL ONCE
OUTPATIENT
Start: 2023-11-15 | End: 2023-11-15

## 2023-11-15 RX ORDER — IBUPROFEN 200 MG
TABLET ORAL ONCE
OUTPATIENT
Start: 2023-11-15 | End: 2023-11-15

## 2023-11-15 RX ORDER — TRIAMCINOLONE ACETONIDE 1 MG/G
OINTMENT TOPICAL ONCE
OUTPATIENT
Start: 2023-11-15 | End: 2023-11-15

## 2023-11-15 RX ORDER — SODIUM CHLOR/HYPOCHLOROUS ACID 0.033 %
SOLUTION, IRRIGATION IRRIGATION ONCE
OUTPATIENT
Start: 2023-11-15 | End: 2023-11-15

## 2023-11-15 ASSESSMENT — PAIN SCALES - GENERAL: PAINLEVEL_OUTOF10: 5

## 2023-11-15 NOTE — WOUND CARE
Wound Clinic Physician Orders and Discharge Instructions  [address] [place] [address] S. 709 [facility], [address] [place] [address] Way  Telephone: [phone]    NAME:  Jacklyn Gibson OF BIRTH:  1990  MEDICAL RECORD NUMBER:  [id number]  DATE:  11/15/2023      Return Appointment:  [x] Dressing Supply Provider: Aaron  [] Home Healthcare:  [x] Return Appointment:    1  Week(s)  [] Nurse Visit:     [] Discharge from [facility]: [] Healed        [] Refer to Provider:         [] Consult    Follow-up Information:  [] Ordered Tests:   [x] Referrals: patient to follow up with surgeon on 11/17  [] Rx:   [] Other:       Wound Cleansing:   Do not scrub or use excessive force. Cleanse wound prior to applying a clean dressing with:  [x] Normal Saline   [] Keep Wound Dry in Shower     [] Wound Cleanser   [x] Cleanse wound with Mild Soap & Water    [] Other:       Topical Treatments:  Do not apply lotions, creams, or ointments to wound bed unless directed. [] Apply moisturizing lotion to skin surrounding the wound prior to dressing change.  [] Apply antifungal ointment to skin surrounding the wound prior to dressing change.  [] Apply thin film of moisture barrier ointment to skin immediately around wound.   [] Apply Betadine to skin immediately around wound   [] Other:      Dressings:           Wound Location   RIGHT BREAST  [x] Apply Primary Dressing:       [] MediHoney Gel [] MediHoney Alginate  [] Calcium Alginate with Silver   [] Calcium Alginate without silver   [] Collagen with silver [] Collagen without Silver    [] Santyl with moist saline gauze     [] Hydrofera Blue (cut to size and moistened with normal saline)  [] Hydrofera Blue Ready (cut to size)      [] Normal Saline wet to dry  [] Betadine wet to dry    [] Hydrogel  [] Mepitel     [] Bactroban/Mupirocin   [] Iodoform Packing Strip [] Plain Packing Strip   [] Skin Sub:   [x] Other:  VASHE WET TO DRY WITH PACKING

## 2023-11-15 NOTE — WOUND CARE
200 [place] and 610 [facility]   Medical Staff Progress Note     303 [facility] RECORD NUMBER:  [id number]  AGE: 26 y.o. GENDER: female  : 1990  EPISODE DATE:  11/15/2023    Chief complaint and reason for visit:   Right breast wound patient underwent a third exploration and drainage of an abscess of the right breast at [facility].  She continues to have pain and drainage of purulent material.  She reports that wound culture showed no bacteria, but she is on antibiotics. She was started on Bactrim and doxycycline as well as another antibiotic which may be Augmentin, she is unsure. Denies fever, but has significant pain in the right breast.  Chief Complaint   Patient presents with    Wound Check     Right breast      Patient presenting for follow up evaluation of wound(s) per chief complaint. Subjective: Symptoms, wound related issues, or other pertinent wound history since last visit: Pain, purulent drainage, no fever    Wound 23 Breast Right #1 (Active)   Wound Image   23   Wound Etiology Other 11/15/23 0839   Dressing Status Clean;Dry; Intact 11/15/23 0839   Wound Cleansed Cleansed with saline 11/15/23 0839   Wound Length (cm) 4 cm 11/15/23 0839   Wound Width (cm) 2.5 cm 11/15/23 0839   Wound Depth (cm) 2.3 cm 11/15/23 0839   Wound Surface Area (cm^2) 10 cm^2 11/15/23 0839   Change in Wound Size % (l*w) -249.65 11/15/23 0839   Wound Volume (cm^3) 23 cm^3 11/15/23 0839   Wound Healing % -177 11/15/23 0839   Post-Procedure Length (cm) 2.6 cm 23   Post-Procedure Width (cm) 1.1 cm 23   Post-Procedure Depth (cm) 2.9 cm 23   Post-Procedure Surface Area (cm^2) 2.86 cm^2 23   Post-Procedure Volume (cm^3) 8.294 cm^3 23   Distance Tunneling (cm) 2.6 cm 23   Tunneling Position ___ O'Clock 10 2323   Undermining Starts ___ O'Clock 3 11/15/23 7450

## 2023-11-15 NOTE — DISCHARGE INSTRUCTIONS
Wound Clinic Physician Orders and Discharge Instructions  [address] [place] [address] S. 709 [facility], [address] [place] [address] Way  Telephone: [phone]    NAME:  Kera Polanco OF BIRTH:  1990  MEDICAL RECORD NUMBER:  [id number]  DATE:  11/15/2023      Return Appointment:  [x] Dressing Supply Provider: Aaron  [] Home Healthcare:  [x] Return Appointment:    1  Week(s)  [] Nurse Visit:     [] Discharge from [facility]: [] Healed        [] Refer to Provider:         [] Consult    Follow-up Information:  [] Ordered Tests:   [x] Referrals: patient to follow up with surgeon on 11/17  [] Rx:   [] Other:       Wound Cleansing:   Do not scrub or use excessive force. Cleanse wound prior to applying a clean dressing with:  [x] Normal Saline   [] Keep Wound Dry in Shower     [] Wound Cleanser   [x] Cleanse wound with Mild Soap & Water    [] Other:       Topical Treatments:  Do not apply lotions, creams, or ointments to wound bed unless directed. [] Apply moisturizing lotion to skin surrounding the wound prior to dressing change.  [] Apply antifungal ointment to skin surrounding the wound prior to dressing change.  [] Apply thin film of moisture barrier ointment to skin immediately around wound.   [] Apply Betadine to skin immediately around wound   [] Other:      Dressings:           Wound Location   RIGHT BREAST  [x] Apply Primary Dressing:       [] MediHoney Gel [] MediHoney Alginate  [] Calcium Alginate with Silver   [] Calcium Alginate without silver   [] Collagen with silver [] Collagen without Silver    [] Santyl with moist saline gauze     [] Hydrofera Blue (cut to size and moistened with normal saline)  [] Hydrofera Blue Ready (cut to size)      [] Normal Saline wet to dry  [] Betadine wet to dry    [] Hydrogel  [] Mepitel     [] Bactroban/Mupirocin   [] Iodoform Packing Strip [] Plain Packing Strip   [] Skin Sub:   [x] Other:  VASHE WET TO DRY WITH PACKING

## 2023-11-15 NOTE — WOUND CARE
[address] [place] [address]:     400 [facility] [address] [place] [address] f: [phone]: [phone] [phone] [email or web address]      Ordering Center:     2 [address] [place] [address] Way    Phone: [phone]  Fax: [phone]    Patient Information:      Palak Rangel  [address] [address]   [phone]   : 1990  AGE: 26 y.o. GENDER: female   EPISODE DATE: 11/15/2023    Insurance:      PRIMARY INSURANCE:  Plan:  EAST  Coverage:  EAST  Effective Date: 2023  Group Number: [unfilled]  Subscriber Number: [id number] - (Other)    Payer/Plan Subscr  Sex Relation Sub. Ins. ID Effective Group Num   1.  EAST * PATYNANDA 3/22/1989 Male Spouse [id number] 23                                    [address]       Patient Wound Information:      Problem List Items Addressed This Visit          Other    Open wound of right breast - Primary    Relevant Orders    Initiate Outpatient Wound Care Protocol       WOUNDS REQUIRING DRESSING SUPPLIES:     Wound 23 Breast Right #1 (Active)   Wound Image   23   Wound Etiology Other 11/15/23 0839   Dressing Status Clean;Dry; Intact 11/15/23 0839   Wound Cleansed Cleansed with saline 11/15/23 0839   Wound Length (cm) 4 cm 11/15/23 0839   Wound Width (cm) 2.5 cm 11/15/23 0839   Wound Depth (cm) 2.3 cm 11/15/23 0839   Wound Surface Area (cm^2) 10 cm^2 11/15/23 0839   Change in Wound Size % (l*w) -249.65 11/15/23 0839   Wound Volume (cm^3) 23 cm^3 11/15/23 0839   Wound Healing % -177 11/15/23 0839   Post-Procedure Length (cm) 2.6 cm 2343   Post-Procedure Width (cm) 1.1 cm 23 0843   Post-Procedure Depth (cm) 2.9 cm 23 0843   Post-Procedure Surface Area (cm^2) 2.86 cm^2 23 0843   Post-Procedure Volume (cm^3) 8.294 cm^3 23 0843   Distance Tunneling (cm) 2.6 cm 23

## 2023-11-22 ENCOUNTER — HOSPITAL ENCOUNTER (OUTPATIENT)
Facility: HOSPITAL | Age: 33
Discharge: HOME | End: 2023-11-22
Attending: SPECIALIST
Payer: OTHER GOVERNMENT

## 2023-11-22 VITALS
HEART RATE: 84 BPM | SYSTOLIC BLOOD PRESSURE: 113 MMHG | DIASTOLIC BLOOD PRESSURE: 70 MMHG | RESPIRATION RATE: 18 BRPM | TEMPERATURE: 98.2 F

## 2023-11-22 DIAGNOSIS — S21.001S OPEN WOUND OF RIGHT BREAST, SEQUELA: Primary | ICD-10-CM

## 2023-11-22 PROCEDURE — 87070 CULTURE OTHR SPECIMN AEROBIC: CPT

## 2023-11-22 PROCEDURE — 11042 DBRDMT SUBQ TIS 1ST 20SQCM/<: CPT

## 2023-11-22 PROCEDURE — 10060 I&D ABSCESS SIMPLE/SINGLE: CPT

## 2023-11-22 PROCEDURE — 87205 SMEAR GRAM STAIN: CPT

## 2023-11-22 RX ORDER — LIDOCAINE HYDROCHLORIDE 20 MG/ML
JELLY TOPICAL ONCE
OUTPATIENT
Start: 2023-11-22 | End: 2023-11-22

## 2023-11-22 RX ORDER — GENTAMICIN SULFATE 1 MG/G
OINTMENT TOPICAL ONCE
OUTPATIENT
Start: 2023-11-22 | End: 2023-11-22

## 2023-11-22 RX ORDER — CLOBETASOL PROPIONATE 0.5 MG/G
OINTMENT TOPICAL ONCE
OUTPATIENT
Start: 2023-11-22 | End: 2023-11-22

## 2023-11-22 RX ORDER — BETAMETHASONE DIPROPIONATE 0.05 %
OINTMENT (GRAM) TOPICAL ONCE
OUTPATIENT
Start: 2023-11-22 | End: 2023-11-22

## 2023-11-22 RX ORDER — IBUPROFEN 200 MG
TABLET ORAL ONCE
OUTPATIENT
Start: 2023-11-22 | End: 2023-11-22

## 2023-11-22 RX ORDER — LIDOCAINE 40 MG/G
CREAM TOPICAL ONCE
OUTPATIENT
Start: 2023-11-22 | End: 2023-11-22

## 2023-11-22 RX ORDER — LIDOCAINE HYDROCHLORIDE 40 MG/ML
SOLUTION TOPICAL ONCE
OUTPATIENT
Start: 2023-11-22 | End: 2023-11-22

## 2023-11-22 RX ORDER — LIDOCAINE 50 MG/G
OINTMENT TOPICAL ONCE
OUTPATIENT
Start: 2023-11-22 | End: 2023-11-22

## 2023-11-22 RX ORDER — GINSENG 100 MG
CAPSULE ORAL ONCE
OUTPATIENT
Start: 2023-11-22 | End: 2023-11-22

## 2023-11-22 RX ORDER — TRIAMCINOLONE ACETONIDE 1 MG/G
OINTMENT TOPICAL ONCE
OUTPATIENT
Start: 2023-11-22 | End: 2023-11-22

## 2023-11-22 RX ORDER — BACITRACIN ZINC AND POLYMYXIN B SULFATE 500; 1000 [USP'U]/G; [USP'U]/G
OINTMENT TOPICAL ONCE
OUTPATIENT
Start: 2023-11-22 | End: 2023-11-22

## 2023-11-22 RX ORDER — SODIUM CHLOR/HYPOCHLOROUS ACID 0.033 %
SOLUTION, IRRIGATION IRRIGATION ONCE
OUTPATIENT
Start: 2023-11-22 | End: 2023-11-22

## 2023-11-22 NOTE — WOUND CARE
Wound Clinic Physician Orders and Discharge Instructions  [address] [place], [place] [address] S. [address]  Telephone: [phone]    NAME:  Cruzito Anthony OF BIRTH:  1990  MEDICAL RECORD NUMBER:  [id number]  DATE:  11/22/2023      Return Appointment:  [x] Dressing Supply Provider: Aaron  [] Home Healthcare:  [x] Return Appointment:    1  Week(s)  [] Nurse Visit:     [] Discharge from [facility]: [] Healed        [] Refer to Provider:         [] Consult    Follow-up Information:  [] Ordered Tests:   [x] Referrals: patient to follow up with surgeon   [] Rx:   [] Other:       Wound Cleansing:   Do not scrub or use excessive force. Cleanse wound prior to applying a clean dressing with:  [x] Normal Saline   [] Keep Wound Dry in Shower     [] Wound Cleanser   [x] Cleanse wound with Mild Soap & Water    [] Other:       Topical Treatments:  Do not apply lotions, creams, or ointments to wound bed unless directed. [] Apply moisturizing lotion to skin surrounding the wound prior to dressing change.  [] Apply antifungal ointment to skin surrounding the wound prior to dressing change.  [] Apply thin film of moisture barrier ointment to skin immediately around wound.   [] Apply Betadine to skin immediately around wound   [] Other:      Dressings:           Wound Location   RIGHT BREAST (Both Wounds)  [x] Apply Primary Dressing:       [] MediHoney Gel [] MediHoney Alginate  [] Calcium Alginate with Silver   [] Calcium Alginate without silver   [] Collagen with silver [] Collagen without Silver    [] Santyl with moist saline gauze     [] Hydrofera Blue (cut to size and moistened with normal saline)  [] Hydrofera Blue Ready (cut to size)      [] Normal Saline wet to dry  [] Betadine wet to dry    [] Hydrogel  [] Mepitel     [] Bactroban/Mupirocin   [] Iodoform Packing Strip [] Plain Packing Strip   [] Skin Sub:   [x] Other:  VASHE WET TO DRY 1 inch

## 2023-11-22 NOTE — WOUND CARE
[address] [place] [address]:     400 [facility] [address] [place] [address] f: [phone] f: [phone] p: [phone] [email or web address]      Ordering Center:     2 [address] [place] [address] Way    Phone: [phone]  Fax: [phone]    Patient Information:      Soto Mater  [address] [place] [address]   [phone]   : 1990  AGE: 26 y.o. GENDER: female   EPISODE DATE: 2023    Insurance:      PRIMARY INSURANCE:  Plan:  EAST  Coverage:  EAST  Effective Date: 2023  Group Number: [unfilled]  Subscriber Number: [id number] - (Other)    Payer/Plan Subscr  Sex Relation Sub. Ins. ID Effective Group Num   1.  EAST * NANDA NEWMAN 3/22/1989 Male Spouse [id number] 23                                    [address]       Patient Wound Information:      Problem List Items Addressed This Visit          Other    Open wound of right breast - Primary    Relevant Orders    Initiate Outpatient Wound Care Protocol       WOUNDS REQUIRING DRESSING SUPPLIES:     Wound 23 Breast Right #1 (Active)   Wound Image   23 0846   Wound Etiology Other 23 0846   Dressing Status Clean;Dry; Intact 23 0846   Wound Cleansed Cleansed with saline 23 0846   Wound Length (cm) 4.3 cm 23 0846   Wound Width (cm) 2 cm 23 0846   Wound Depth (cm) 2 cm 23 0846   Wound Surface Area (cm^2) 8.6 cm^2 23 0846   Change in Wound Size % (l*w) -200.7 23 0846   Wound Volume (cm^3) 17.2 cm^3 23 0846   Wound Healing % -107 23 0846   Post-Procedure Length (cm) 4.3 cm 23 0859   Post-Procedure Width (cm) 2 cm 23   Post-Procedure Depth (cm) 2 cm 23   Post-Procedure Surface Area (cm^2) 8.6 cm^2 23   Post-Procedure Volume (cm^3) 17.2 cm^3 23   Distance Tunneling (cm) 2.6 cm 23

## 2023-11-22 NOTE — WOUND CARE
[address] [place] and 610 [facility]   Medical Staff Progress Note     303 [facility] RECORD NUMBER:  [id number]  AGE: 26 y.o. GENDER: female  : 1990  EPISODE DATE:  2023    Chief complaint and reason for visit:   R breast abscess  Chief Complaint   Patient presents with    Wound Check     R breast      Patient presenting for follow up evaluation of wound(s) per chief complaint. Subjective: Symptoms, wound related issues, or other pertinent wound history since last visit: Patient returns visit today surgeon who operated on her right breast, who is now referred the patient to another physician in [facility]. She has not had further surgery since her last visit to this clinic. She reports less pain in the right breast.  No report of fever or increased drainage. She has developed an area lateral to the original surgical incision. Wound 23 Breast Right #1 (Active)   Wound Image   23   Wound Etiology Other 23   Dressing Status Clean;Dry; Intact 23   Wound Cleansed Cleansed with saline 23   Wound Length (cm) 4.3 cm 2346   Wound Width (cm) 2 cm 2346   Wound Depth (cm) 2 cm 2346   Wound Surface Area (cm^2) 8.6 cm^2 23   Change in Wound Size % (l*w) -200.7 23   Wound Volume (cm^3) 17.2 cm^3 2346   Wound Healing % -107 2346   Post-Procedure Length (cm) 4.3 cm 23 0859   Post-Procedure Width (cm) 2 cm 2359   Post-Procedure Depth (cm) 2 cm 2359   Post-Procedure Surface Area (cm^2) 8.6 cm^2 23   Post-Procedure Volume (cm^3) 17.2 cm^3 2359   Distance Tunneling (cm) 2.6 cm 23   Tunneling Position ___ O'Clock 10 23   Undermining Starts ___ O'Clock 11 11/22/23 0846   Undermining Ends___ O'Clock 2 23 0846   Undermining [place] Distance (cm) 2.4 23 0846

## 2023-11-25 LAB
BACTERIA SPEC CULT: ABNORMAL
GRAM STN SPEC: ABNORMAL
GRAM STN SPEC: ABNORMAL
Lab: ABNORMAL

## 2023-11-29 ENCOUNTER — HOSPITAL ENCOUNTER (OUTPATIENT)
Facility: HOSPITAL | Age: 33
Discharge: HOME | End: 2023-11-29
Attending: SPECIALIST
Payer: OTHER GOVERNMENT

## 2023-11-29 VITALS
DIASTOLIC BLOOD PRESSURE: 75 MMHG | TEMPERATURE: 98.7 F | RESPIRATION RATE: 18 BRPM | SYSTOLIC BLOOD PRESSURE: 115 MMHG | HEART RATE: 87 BPM

## 2023-11-29 DIAGNOSIS — S21.001S OPEN WOUND OF RIGHT BREAST, SEQUELA: Primary | ICD-10-CM

## 2023-11-29 PROCEDURE — 99213 OFFICE O/P EST LOW 20 MIN: CPT

## 2023-11-29 RX ORDER — LIDOCAINE HYDROCHLORIDE 20 MG/ML
JELLY TOPICAL ONCE
Status: CANCELLED | OUTPATIENT
Start: 2023-11-29 | End: 2023-11-29

## 2023-11-29 RX ORDER — IBUPROFEN 200 MG
TABLET ORAL ONCE
Status: CANCELLED | OUTPATIENT
Start: 2023-11-29 | End: 2023-11-29

## 2023-11-29 RX ORDER — TRIAMCINOLONE ACETONIDE 1 MG/G
OINTMENT TOPICAL ONCE
Status: CANCELLED | OUTPATIENT
Start: 2023-11-29 | End: 2023-11-29

## 2023-11-29 RX ORDER — SODIUM CHLOR/HYPOCHLOROUS ACID 0.033 %
SOLUTION, IRRIGATION IRRIGATION ONCE
Status: CANCELLED | OUTPATIENT
Start: 2023-11-29 | End: 2023-11-29

## 2023-11-29 RX ORDER — BACITRACIN ZINC AND POLYMYXIN B SULFATE 500; 1000 [USP'U]/G; [USP'U]/G
OINTMENT TOPICAL ONCE
Status: CANCELLED | OUTPATIENT
Start: 2023-11-29 | End: 2023-11-29

## 2023-11-29 RX ORDER — GENTAMICIN SULFATE 1 MG/G
OINTMENT TOPICAL ONCE
Status: CANCELLED | OUTPATIENT
Start: 2023-11-29 | End: 2023-11-29

## 2023-11-29 RX ORDER — LIDOCAINE 50 MG/G
OINTMENT TOPICAL ONCE
Status: CANCELLED | OUTPATIENT
Start: 2023-11-29 | End: 2023-11-29

## 2023-11-29 RX ORDER — LIDOCAINE HYDROCHLORIDE 40 MG/ML
SOLUTION TOPICAL ONCE
Status: CANCELLED | OUTPATIENT
Start: 2023-11-29 | End: 2023-11-29

## 2023-11-29 RX ORDER — BETAMETHASONE DIPROPIONATE 0.05 %
OINTMENT (GRAM) TOPICAL ONCE
Status: CANCELLED | OUTPATIENT
Start: 2023-11-29 | End: 2023-11-29

## 2023-11-29 RX ORDER — CLOBETASOL PROPIONATE 0.5 MG/G
OINTMENT TOPICAL ONCE
Status: CANCELLED | OUTPATIENT
Start: 2023-11-29 | End: 2023-11-29

## 2023-11-29 RX ORDER — GINSENG 100 MG
CAPSULE ORAL ONCE
Status: CANCELLED | OUTPATIENT
Start: 2023-11-29 | End: 2023-11-29

## 2023-11-29 RX ORDER — LIDOCAINE 40 MG/G
CREAM TOPICAL ONCE
Status: CANCELLED | OUTPATIENT
Start: 2023-11-29 | End: 2023-11-29

## 2023-11-29 NOTE — DISCHARGE INSTRUCTIONS
Plain Packing Strip   [] Skin Sub:   [x] Other:  VASHE WET TO DRY 1 inch conform                                     [x] Cover and Secure with:     [x] Gauze [] Kendra [] Kerlix   [] Zetuvit Plus Silicone Border [] Super Absorbant [] ABD     [] Ace Wrap [] Other:    Limit contact of tape with skin. [x] Change dressing: [x] Daily     Every Other Day   [] Twice per week   [] Three times per week[]   [] Once a week [] Do Not Change Dressing   [] Other:     Negative Pressure:           Wound Location:   [] Pressure @  mm/Hg  []Continuous []Intermittent   [] Black Foam  [] White Foam [] Bridge  [] Change dressing 3 times per week     [] Other:     Pressure Relief:  [] When sitting, shift position or do seat lifts every 15 minutes.  [] Wheelchair cushion [] Specialty Bed/Mattress  [] Turn every 2 hours when in bed. Avoid direct pressure on wound site. Limit side lying to 30 degree tilt. Limit HOB elevation to 30 degrees. [] Other       Dietary:  [] Diet as tolerated: [] Calorie Diabetic Diet: [] No Added Salt:  [x] Increase Protein: [] Other:     Activity:  [x] Activity as tolerated:    [] Patient has no activity restrictions      [] Strict Bedrest   [] Remain off Work      [] May return to full duty work                                     [] Return to work with restrictions     Physician:  [x] Dr. Trenton Nathan  [] Dr. Margaret Key  [] Dr. Guero Edwards      Nurse Case Manger:  [address] Information: Should you experience any significant changes in your wound(s) or have questions about your wound care, please contact the [employer] at [phone]. Our hours are Monday - Friday 8am - 4:30pm, closed all major holidays. If you need help with your wound outside these hours and cannot wait until we are again available, contact your PCP or go to the hospital emergency room.      PLEASE NOTE: IF YOU ARE UNABLE TO OBTAIN WOUND SUPPLIES, CONTINUE TO USE THE SUPPLIES YOU HAVE AVAILABLE UNTIL

## 2023-11-29 NOTE — WOUND CARE
[address] [place] [address] [facility]   Medical Staff Progress Note     [facility] RECORD NUMBER:  [id number]  AGE: 26 y.o. GENDER: female  : 1990  EPISODE DATE:  2023    Chief complaint and reason for visit:   Right breast abscess drainage wound  Chief Complaint   Patient presents with    Wound Check     R breast      Patient presenting for follow up evaluation of wound(s) per chief complaint. Subjective: Symptoms, wound related issues, or other pertinent wound history since last visit: The patient returns after 1 week, using Vashe wet-to-dry daily dressing changes to treat the right breast wounds. She has having pain, but denies fever. A new area of subcutaneous fluid accumulation was aspirated by the nurse practitioner yesterday. Wound culture taken 1 week ago showed 4+ white blood cells and no organisms on Gram stain. Final culture grew a few staph species, coagulase-negative. She was not started on antibiotics. Wound 23 Breast Right #1 (Active)   Wound Image   23   Wound Etiology Other 23   Dressing Status Clean;Dry; Intact 23   Wound Cleansed Cleansed with saline 23   Wound Length (cm) 1.6 cm 23   Wound Width (cm) 3.3 cm 23   Wound Depth (cm) 1.6 cm 23   Wound Surface Area (cm^2) 5.28 cm^2 23   Change in Wound Size % (l*w) -84.62 23   Wound Volume (cm^3) 8.448 cm^3 23   Wound Healing % -2 23   Post-Procedure Length (cm) 4.3 cm 23   Post-Procedure Width (cm) 2 cm 23   Post-Procedure Depth (cm) 2 cm 23   Post-Procedure Surface Area (cm^2) 8.6 cm^2 23   Post-Procedure Volume (cm^3) 17.2 cm^3 23   Distance Tunneling (cm) 2 cm 23   Tunneling Position ___ O'Clock 11 23 0841   Undermining Starts ___ O'Clock 11 23 0846

## 2023-11-29 NOTE — WOUND CARE
Wound Clinic Physician Orders and Discharge Instructions  [address] [place] [address] S. [address] [facility], [address]  Telephone: [phone]    NAME:  Kera Polanco OF BIRTH:  1990  MEDICAL RECORD NUMBER:  [id number]  DATE:  11/29/2023      Return Appointment:  [x] Dressing Supply Provider: Aaron  [] Home Healthcare:  [] Return Appointment:    [facility])  [] Nurse Visit:     [x] Discharge from [facility]: [] Healed        [x] Refer to Provider:  [employer]   12/5     [] Consult/    Follow-up Information:  [] Ordered Tests:   [x] Referrals: patient to follow up with surgeon 12/5 for possible mastectomy   [] Rx:   [x] Other: return to the [facility] in future if needed      Wound Cleansing:   Do not scrub or use excessive force. Cleanse wound prior to applying a clean dressing with:  [x] Normal Saline   [] Keep Wound Dry in Shower     [] Wound Cleanser   [x] Cleanse wound with Mild Soap & Water    [] Other:       Topical Treatments:  Do not apply lotions, creams, or ointments to wound bed unless directed. [] Apply moisturizing lotion to skin surrounding the wound prior to dressing change.  [] Apply antifungal ointment to skin surrounding the wound prior to dressing change.  [] Apply thin film of moisture barrier ointment to skin immediately around wound.   [] Apply Betadine to skin immediately around wound   [] Other:      Dressings:           Wound Location   RIGHT BREAST (Both Wounds)  [x] Apply Primary Dressing:       [] MediHoney Gel [] MediHoney Alginate  [] Calcium Alginate with Silver   [] Calcium Alginate without silver   [] Collagen with silver [] Collagen without Silver    [] Santyl with moist saline gauze     [] Hydrofera Blue (cut to size and moistened with normal saline)  [] Hydrofera Blue Ready (cut to size)      [] Normal Saline wet to dry  [] Betadine wet to dry    [] Hydrogel  [] Mepitel     [] Bactroban/Mupirocin   [] Iodoform Packing Strip []

## 2024-03-26 ENCOUNTER — TELEPHONE (OUTPATIENT)
Facility: CLINIC | Age: 34
End: 2024-03-26

## 2024-03-26 NOTE — TELEPHONE ENCOUNTER
Pt is having BP and BS issues. She states her BP has been high for a while. She states it was 170s-180s/120s. She states after taking her BP medication it goes down to 160s.  She is having issues with urination at night. [facility] in her kidney as well as tumor in her bladder. She was advised to follow up with PCP. She was scheduled for 3/28/2024.SKIP PORTILLO MA

## 2024-03-28 ENCOUNTER — OFFICE VISIT (OUTPATIENT)
Facility: CLINIC | Age: 34
End: 2024-03-28
Payer: OTHER GOVERNMENT

## 2024-03-28 VITALS
HEART RATE: 100 BPM | HEIGHT: 59 IN | BODY MASS INDEX: 27.82 KG/M2 | DIASTOLIC BLOOD PRESSURE: 100 MMHG | WEIGHT: 138 LBS | RESPIRATION RATE: 16 BRPM | OXYGEN SATURATION: 98 % | SYSTOLIC BLOOD PRESSURE: 150 MMHG

## 2024-03-28 DIAGNOSIS — N32.9 LESION OF URINARY BLADDER: ICD-10-CM

## 2024-03-28 DIAGNOSIS — N61.21 GRANULOMATOUS MASTITIS OF RIGHT BREAST: ICD-10-CM

## 2024-03-28 DIAGNOSIS — Z79.52 LONG TERM (CURRENT) USE OF SYSTEMIC STEROIDS: ICD-10-CM

## 2024-03-28 DIAGNOSIS — I10 ESSENTIAL HYPERTENSION, BENIGN: ICD-10-CM

## 2024-03-28 DIAGNOSIS — I82.3 THROMBOSIS OF LEFT RENAL VEIN (HCC): Primary | ICD-10-CM

## 2024-03-28 DIAGNOSIS — I82.3 THROMBOSIS OF LEFT RENAL VEIN (HCC): ICD-10-CM

## 2024-03-28 PROCEDURE — 99214 OFFICE O/P EST MOD 30 MIN: CPT | Performed by: INTERNAL MEDICINE

## 2024-03-28 PROCEDURE — 3077F SYST BP >= 140 MM HG: CPT | Performed by: INTERNAL MEDICINE

## 2024-03-28 PROCEDURE — 3080F DIAST BP >= 90 MM HG: CPT | Performed by: INTERNAL MEDICINE

## 2024-03-28 RX ORDER — TRIAMTERENE AND HYDROCHLOROTHIAZIDE 37.5; 25 MG/1; MG/1
1 TABLET ORAL DAILY
COMMUNITY

## 2024-03-28 RX ORDER — BISOPROLOL FUMARATE AND HYDROCHLOROTHIAZIDE 5; 6.25 MG/1; MG/1
1 TABLET ORAL DAILY
Qty: 30 TABLET | Refills: 3 | Status: SHIPPED | OUTPATIENT
Start: 2024-03-28

## 2024-03-28 RX ORDER — PREDNISONE 50 MG/1
50 TABLET ORAL DAILY
COMMUNITY
Start: 2024-01-04

## 2024-03-28 ASSESSMENT — ENCOUNTER SYMPTOMS
SHORTNESS OF BREATH: 0
ABDOMINAL PAIN: 0
COUGH: 0
NAUSEA: 0
VOMITING: 0
DIARRHEA: 0

## 2024-03-28 ASSESSMENT — PATIENT HEALTH QUESTIONNAIRE - PHQ9
1. LITTLE INTEREST OR PLEASURE IN DOING THINGS: NOT AT ALL
SUM OF ALL RESPONSES TO PHQ QUESTIONS 1-9: 0
2. FEELING DOWN, DEPRESSED OR HOPELESS: NOT AT ALL
SUM OF ALL RESPONSES TO PHQ QUESTIONS 1-9: 0
SUM OF ALL RESPONSES TO PHQ9 QUESTIONS 1 & 2: 0

## 2024-03-28 NOTE — ASSESSMENT & PLAN NOTE
Advised to follow-up appointment with the breast surgeon.  May need to decrease dose of prednisone

## 2024-03-28 NOTE — ASSESSMENT & PLAN NOTE
Since patient is getting tachycardia and blood pressure is elevated, will discontinue Maxide and change to Ziac 5 mg.  Advised to monitor blood pressure at home.

## 2024-03-28 NOTE — PROGRESS NOTES
[facility]  [address] [place], [place] [address]  Phone: [phone]      Lety Nesbitt (: 1990) is a 33 y.o. female, established patient, here for evaluation of the following chief complaint(s):  Hypertension         SUBJECTIVE/OBJECTIVE:  HPI:  This is a 32-year-old female who has been having cellulitis and abscess on her right breast after bumping on the door since 2024, recently went to [facility] and was found to have blood in the urine.  Patient had a CT scan done after that which showed normal kidneys.  No stones.  Focal nodule posterior bladder or best seen on excretory phase measuring 4 mm.  Incidental note right corpus luteal cyst and left renal vein thrombosis.  Numerous uterine fibroids.  Patient states she cannot get an appointment with urologist in [facility] till .  Wants to be referred to local urologist.  Also has noted blood pressure has been running high and has been getting palpitation.  Has been on prednisone 50 mg by wound clinic since November.  Patient also states she has been getting up multiple times at night to urinate.  She says that she says that she has cut back the caffeine   She says that she needs a referral to a urologist.     Hemoglobin A1C   Date Value Ref Range Status   2023 5.4 4.8 - 5.6 % Final     Comment:              Prediabetes: 5.7 - 6.4           Diabetes: >6.4           Glycemic control for adults with diabetes: <7.0        Hemoglobin   Date Value Ref Range Status   10/06/2023 10.8 (L) 11.5 - 16.0 g/dL Final     Hematocrit   Date Value Ref Range Status   10/06/2023 32.8 (L) 35.0 - 47.0 % Final     Creatinine   Date Value Ref Range Status   10/12/2023 0.69 0.57 - 1.00 mg/dL Final     Glucose   Date Value Ref Range Status   10/12/2023 81 70 - 99 mg/dL Final     Potassium   Date Value Ref Range Status   10/12/2023 4.4 3.5 - 5.2 mmol/L Final     Cholesterol   Date Value Ref Range Status

## 2024-03-28 NOTE — ASSESSMENT & PLAN NOTE
Etiology unclear.  Will do hypercoagulable workup and refer to hematologist.  I am not sure whether she needs to be anticoagulated.

## 2024-03-28 NOTE — PROGRESS NOTES
1. \"Have you been to the ER, urgent care clinic since your last visit?  Hospitalized since your last visit?\" No    2. \"Have you seen or consulted any other health care providers outside of the [facility] since your last visit?\" No     3. For patients aged 45-75: Has the patient had a colonoscopy / FIT/ Cologuard? NA - based on age      If the patient is female:    4. For patients aged 40-74: Has the patient had a mammogram within the past 2 years? NA - based on age or sex      5. For patients aged 21-65: Has the patient had a pap smear? Yes - Care Gap present. Most recent result on file

## 2024-03-29 ENCOUNTER — TELEPHONE (OUTPATIENT)
Facility: CLINIC | Age: 34
End: 2024-03-29

## 2024-03-29 LAB
ALBUMIN SERPL-MCNC: 4.8 G/DL (ref 3.9–4.9)
ALBUMIN/GLOB SERPL: 1.6 {RATIO} (ref 1.2–2.2)
ALP SERPL-CCNC: 64 IU/L (ref 44–121)
ALT SERPL-CCNC: 18 IU/L (ref 0–32)
APPEARANCE UR: CLEAR
AST SERPL-CCNC: 8 IU/L (ref 0–40)
BACTERIA #/AREA URNS HPF: NORMAL /[HPF]
BASOPHILS # BLD AUTO: 0.1 X10E3/UL (ref 0–0.2)
BASOPHILS NFR BLD AUTO: 0 %
BILIRUB SERPL-MCNC: 0.3 MG/DL (ref 0–1.2)
BILIRUB UR QL STRIP: NEGATIVE
BUN SERPL-MCNC: 15 MG/DL (ref 6–20)
BUN/CREAT SERPL: 19 (ref 9–23)
CALCIUM SERPL-MCNC: 10.1 MG/DL (ref 8.7–10.2)
CASTS URNS QL MICRO: NORMAL /LPF
CHLORIDE SERPL-SCNC: 98 MMOL/L (ref 96–106)
CO2 SERPL-SCNC: 26 MMOL/L (ref 20–29)
COLOR UR: YELLOW
CREAT SERPL-MCNC: 0.81 MG/DL (ref 0.57–1)
EGFRCR SERPLBLD CKD-EPI 2021: 97 ML/MIN/1.73
EOSINOPHIL # BLD AUTO: 0 X10E3/UL (ref 0–0.4)
EOSINOPHIL NFR BLD AUTO: 0 %
EPI CELLS #/AREA URNS HPF: NORMAL /HPF (ref 0–10)
ERYTHROCYTE [DISTWIDTH] IN BLOOD BY AUTOMATED COUNT: 16.5 % (ref 11.7–15.4)
ERYTHROCYTE [SEDIMENTATION RATE] IN BLOOD BY WESTERGREN METHOD: 11 MM/HR (ref 0–32)
GLOBULIN SER CALC-MCNC: 3 G/DL (ref 1.5–4.5)
GLUCOSE SERPL-MCNC: 89 MG/DL (ref 70–99)
GLUCOSE UR QL STRIP: NEGATIVE
HBA1C MFR BLD: 6.4 % (ref 4.8–5.6)
HCT VFR BLD AUTO: 42 % (ref 34–46.6)
HGB BLD-MCNC: 13.4 G/DL (ref 11.1–15.9)
HGB UR QL STRIP: NEGATIVE
IMM GRANULOCYTES # BLD AUTO: 0 X10E3/UL (ref 0–0.1)
IMM GRANULOCYTES NFR BLD AUTO: 0 %
KETONES UR QL STRIP: NEGATIVE
LEUKOCYTE ESTERASE UR QL STRIP: NEGATIVE
LYMPHOCYTES # BLD AUTO: 6 X10E3/UL (ref 0.7–3.1)
LYMPHOCYTES NFR BLD AUTO: 38 %
MCH RBC QN AUTO: 27.4 PG (ref 26.6–33)
MCHC RBC AUTO-ENTMCNC: 31.9 G/DL (ref 31.5–35.7)
MCV RBC AUTO: 86 FL (ref 79–97)
MICRO URNS: NORMAL
MICRO URNS: NORMAL
MONOCYTES # BLD AUTO: 1.4 X10E3/UL (ref 0.1–0.9)
MONOCYTES NFR BLD AUTO: 9 %
NEUTROPHILS # BLD AUTO: 8.4 X10E3/UL (ref 1.4–7)
NEUTROPHILS NFR BLD AUTO: 53 %
NITRITE UR QL STRIP: NEGATIVE
PH UR STRIP: 7 [PH] (ref 5–7.5)
PLATELET # BLD AUTO: 344 X10E3/UL (ref 150–450)
POTASSIUM SERPL-SCNC: 3.4 MMOL/L (ref 3.5–5.2)
PROT SERPL-MCNC: 7.8 G/DL (ref 6–8.5)
PROT UR QL STRIP: NEGATIVE
RBC # BLD AUTO: 4.89 X10E6/UL (ref 3.77–5.28)
RBC #/AREA URNS HPF: NORMAL /HPF (ref 0–2)
SODIUM SERPL-SCNC: 140 MMOL/L (ref 134–144)
SP GR UR STRIP: 1.02 (ref 1–1.03)
URINALYSIS REFLEX: NORMAL
UROBILINOGEN UR STRIP-MCNC: 0.2 MG/DL (ref 0.2–1)
WBC # BLD AUTO: 15.9 X10E3/UL (ref 3.4–10.8)
WBC #/AREA URNS HPF: NORMAL /HPF (ref 0–5)

## 2024-03-29 NOTE — TELEPHONE ENCOUNTER
Discussed with Dr. Diaz who is nephrologist about renal vein thrombosis.  Since patient is young and has history of granulomatous disease of breast, suggested to go to [facility] ER for further evaluation.  Patient needs to be anticoagulated.  May need renal Doppler also.  Also has Dr. Hematologist Dr. Lori Myers in.  Hypercoagulable workup in progress.  Suggested anticoagulation.  Discussed with patient about options.  Advised to go to [facility] ER.  CASIMIRO GRIMES MD

## 2024-04-03 ENCOUNTER — OFFICE VISIT (OUTPATIENT)
Facility: CLINIC | Age: 34
End: 2024-04-03
Payer: OTHER GOVERNMENT

## 2024-04-03 VITALS
BODY MASS INDEX: 28.22 KG/M2 | TEMPERATURE: 98.4 F | WEIGHT: 140 LBS | HEIGHT: 59 IN | RESPIRATION RATE: 16 BRPM | HEART RATE: 73 BPM | OXYGEN SATURATION: 100 % | SYSTOLIC BLOOD PRESSURE: 136 MMHG | DIASTOLIC BLOOD PRESSURE: 90 MMHG

## 2024-04-03 DIAGNOSIS — R73.03 PRE-DIABETES: ICD-10-CM

## 2024-04-03 DIAGNOSIS — I82.3 THROMBOSIS OF LEFT RENAL VEIN (HCC): Primary | ICD-10-CM

## 2024-04-03 DIAGNOSIS — I10 ESSENTIAL HYPERTENSION, BENIGN: ICD-10-CM

## 2024-04-03 DIAGNOSIS — N61.21 GRANULOMATOUS MASTITIS OF RIGHT BREAST: ICD-10-CM

## 2024-04-03 PROBLEM — M54.50 CHRONIC LOW BACK PAIN: Status: ACTIVE | Noted: 2024-04-03

## 2024-04-03 PROBLEM — G89.29 CHRONIC LOW BACK PAIN: Status: ACTIVE | Noted: 2024-04-03

## 2024-04-03 PROCEDURE — 99214 OFFICE O/P EST MOD 30 MIN: CPT | Performed by: INTERNAL MEDICINE

## 2024-04-03 PROCEDURE — 3080F DIAST BP >= 90 MM HG: CPT | Performed by: INTERNAL MEDICINE

## 2024-04-03 PROCEDURE — 3075F SYST BP GE 130 - 139MM HG: CPT | Performed by: INTERNAL MEDICINE

## 2024-04-03 ASSESSMENT — ENCOUNTER SYMPTOMS
COUGH: 0
NAUSEA: 0
VOMITING: 0

## 2024-04-03 NOTE — PROGRESS NOTES
1. \"Have you been to the ER, urgent care clinic since your last visit?  Hospitalized since your last visit?\" Yes When: [id number] Where: [facility] Reason for visit: kidney problems    2. \"Have you seen or consulted any other health care providers outside of the [facility] since your last visit?\" No     3. For patients aged 45-75: Has the patient had a colonoscopy / FIT/ Cologuard? NA - based on age      If the patient is female:    4. For patients aged 40-74: Has the patient had a mammogram within the past 2 years? NA - based on age or sex      5. For patients aged 21-65: Has the patient had a pap smear? Yes - Care Gap present. Most recent result on file   
constipation, diarrhea, nausea and vomiting.   Genitourinary:  Positive for flank pain. Negative for dysuria.   Skin:  Negative for rash.   Neurological:  Negative for tremors and headaches.   Psychiatric/Behavioral:  The patient is not nervous/anxious.        BP (!) 136/90 (Site: Left Upper Arm, Position: Sitting, Cuff Size: Medium Adult)   Pulse 73   Temp 98.4 °F (36.9 °C)   Resp 16   Ht 1.499 m (4' 11\")   Wt 63.5 kg (140 lb)   SpO2 100%   BMI 28.28 kg/m²      Physical Exam  Vitals and nursing note reviewed.   Gen:  Not  in no acute distress. Well built and nourished.  HEENT:  [place] conjunctivae, BROOKE, EOMI, hearing intact .  Mouth: Moist mucous membranes. No TPC  Neck:  Supple, without masses, thyroid not enlarged  Resp:  No accessory muscle use, clear breath sounds without wheezes rales or rhonchi  Card:  No murmurs, normal S1, S2 without thrills, bruits.  No peripheral edema  Abd:  Soft, non-tender, non-distended, normoactive bowel sounds are present, no palpable organomegaly and no detectable hernias.  No costovertebral angle tenderness  Lymph:  No cervical or inguinal adenopathy  Musc:  No cyanosis or clubbing.   Gait: Normal  Skin:  No rashes or ulcers, skin turgor is good.  Positive multiple tattoos   Neuro: Alert and oriented x 3. Cranial nerves are grossly intact, no focal motor weakness, follows commands appropriately  Psych:  Good insight, mood normal.  ASSESSMENT/PLAN:  Below is the assessment and plan developed based on review of pertinent history, physical exam, labs, studies, and medications.    1. Thrombosis of left renal vein (HCC)  Comments:  Ultrasound did not show any renal vein thrombosis.  Will make appointment with  nephrologist.  Discussed with patient about starting on anticoagulation.  Assessment & Plan:  Emergency room records reviewed.  Option of starting on anticoagulation discussed and repeating CT scan.  Patient wants to wait.  Orders:  -     External Referral To

## 2024-04-05 ASSESSMENT — ENCOUNTER SYMPTOMS
ABDOMINAL PAIN: 0
DIARRHEA: 0
SHORTNESS OF BREATH: 0

## 2024-04-05 NOTE — ASSESSMENT & PLAN NOTE
Emergency room records reviewed.  Option of starting on anticoagulation discussed and repeating CT scan.  Patient wants to wait.

## 2024-04-11 ENCOUNTER — OFFICE VISIT (OUTPATIENT)
Age: 34
End: 2024-04-11
Payer: OTHER GOVERNMENT

## 2024-04-11 VITALS
OXYGEN SATURATION: 98 % | DIASTOLIC BLOOD PRESSURE: 83 MMHG | WEIGHT: 140 LBS | HEART RATE: 75 BPM | BODY MASS INDEX: 28.22 KG/M2 | HEIGHT: 59 IN | SYSTOLIC BLOOD PRESSURE: 138 MMHG | RESPIRATION RATE: 14 BRPM

## 2024-04-11 DIAGNOSIS — R35.1 NOCTURIA: ICD-10-CM

## 2024-04-11 DIAGNOSIS — N32.9 LESION OF URINARY BLADDER: Primary | ICD-10-CM

## 2024-04-11 DIAGNOSIS — R31.29 MICROHEMATURIA: ICD-10-CM

## 2024-04-11 LAB
BILIRUBIN, URINE, POC: NEGATIVE
BLOOD URINE, POC: NEGATIVE
GLUCOSE URINE, POC: NEGATIVE
KETONES, URINE, POC: NEGATIVE
LEUKOCYTE ESTERASE, URINE, POC: NEGATIVE
NITRITE, URINE, POC: NEGATIVE
PH, URINE, POC: 6 (ref 4.6–8)
PROTEIN,URINE, POC: NEGATIVE
SPECIFIC GRAVITY, URINE, POC: 1.03 (ref 1–1.03)
URINALYSIS CLARITY, POC: CLEAR
URINALYSIS COLOR, POC: YELLOW
UROBILINOGEN, POC: NORMAL

## 2024-04-11 PROCEDURE — 99244 OFF/OP CNSLTJ NEW/EST MOD 40: CPT | Performed by: UROLOGY

## 2024-04-11 PROCEDURE — 81003 URINALYSIS AUTO W/O SCOPE: CPT | Performed by: UROLOGY

## 2024-04-11 PROCEDURE — 3075F SYST BP GE 130 - 139MM HG: CPT | Performed by: UROLOGY

## 2024-04-11 PROCEDURE — 3079F DIAST BP 80-89 MM HG: CPT | Performed by: UROLOGY

## 2024-04-11 NOTE — PROGRESS NOTES
Chief Complaint   Patient presents with    New Patient     Lesion of urinary bladder     1. Have you been to the ER, urgent care clinic since your last visit?  Hospitalized since your last visit? Yes [facility] 3/31/2024 blood clot in kidney     2. Have you seen or consulted any other health care providers outside of the [facility] since your last visit?  Include any pap smears or colon screening. No  /83 (Site: Left Upper Arm, Position: Sitting, Cuff Size: Medium Adult)   Pulse 75   Resp 14   Ht 1.499 m (4' 11\")   Wt 63.5 kg (140 lb)   SpO2 98%   BMI 28.28 kg/m²

## 2024-04-11 NOTE — ASSESSMENT & PLAN NOTE
[facility]  No gross hematuria or symptoms.  CT without renal masses or stone.  Possible 4mm bladder mass on CT.  Plan on cystoscopy.

## 2024-04-11 NOTE — ASSESSMENT & PLAN NOTE
Unclear if she is withholding urine during the day.  I advised timed voiding during the day.  I advised elevating her legs in the evening.

## 2024-04-11 NOTE — PROGRESS NOTES
HISTORY OF PRESENT ILLNESS  Lety Nesbitt is a 34 y.o. female.   has a past medical history of Anemia, Granulomatous mastitis of right breast, and Hypertension.  has a past surgical history that includes  section (2019); Endometrial ablation; eye surgery; and Breast surgery (Right, 2023).  Chief Complaint   Patient presents with    New Patient     Lesion of urinary bladder     She is referred by Dr. Sandor Preston for a bladder lesion.  She initially was found to have asymptomatic hematuria.      She has nocturia about 1 month.  She is up 5-6x, of a full amount.  She hydrates all day long.  She is not voiding much during the day.  She feels thirsty and mainly drinks water.  She does not drink caffeine or alcohol.  She denies leg swelling.  She denies gross hematuria.  She denies NSAID use.  She had a uterine ablation and does not have periods.     She [facility].  The kidneys were normal appearance.  The bladder was mildly distended without evidence of intraluminal mass or debris.  There was no evidence of renal vein thrombus.    See he saw Dr. Preston on 4/3/2024.  At that time she stated she still had left flank pain rib and could not work for few days.  It resolved.      She had evidence of left renal vein thrombus on an outpatient CT scan.  The patient apparently had a CT scan for asymptomatic microhematuria evaluation.  [facility].  The reading states that there were no kidney stones or masses, hydronephrosis or hydroureter.  The bladder reading describes a tiny nodule, about 4 mm along the posterior bladder.    In the body of the CT report I cannot find the description of the renal vein thrombus.  In the impression there was mention of extensive left renal vein thrombus.    3/24/[facility].  She has not seen someone.     Nonsmoker.  No family history of cancer.          1. Lesion of

## 2024-04-23 ENCOUNTER — OFFICE VISIT (OUTPATIENT)
Age: 34
End: 2024-04-23
Payer: OTHER GOVERNMENT

## 2024-04-23 VITALS — HEIGHT: 59 IN | WEIGHT: 140 LBS | BODY MASS INDEX: 28.22 KG/M2

## 2024-04-23 DIAGNOSIS — N61.21 GRANULOMATOUS MASTITIS OF RIGHT BREAST: ICD-10-CM

## 2024-04-23 DIAGNOSIS — N64.4 MASTODYNIA OF RIGHT BREAST: Primary | ICD-10-CM

## 2024-04-23 PROCEDURE — 99213 OFFICE O/P EST LOW 20 MIN: CPT | Performed by: SURGERY

## 2024-04-23 RX ORDER — PREDNISONE 20 MG/1
20 TABLET ORAL DAILY
COMMUNITY

## 2024-04-23 NOTE — PROGRESS NOTES
HISTORY OF PRESENT ILLNESS  Lety Nesbitt is a 34 y.o. female     HPI ESTABLISHED patient here for 6 month follow-up for RIGHT breast granulomatous mastitis.   She still has pain.  Weaning off prednisone.      Last seen on 10/24/2023 for RIGHT granulomatous mastitis.   She had I & D 9/2023.  Wound care clinic until 12/2023    Review of Systems      Physical Exam  Chest:   Breasts:     Right: Skin change (multiple scars UOQ) and tenderness present. No swelling.                ASSESSMENT and PLAN   Diagnosis Orders   1. Mastodynia of right breast        2. Granulomatous mastitis of right breast        I spent 20 minutes reviewing previous notes and test results, face to face with the patient discussing diagnosis and treatment options, and documenting today's visit.    RIGHT breast granulomatous mastitis  No active drainage or erythema at this time  Pt will call me if symptoms flare up  Follow up in 6 months

## 2024-05-01 ENCOUNTER — OFFICE VISIT (OUTPATIENT)
Facility: CLINIC | Age: 34
End: 2024-05-01
Payer: OTHER GOVERNMENT

## 2024-05-01 VITALS
DIASTOLIC BLOOD PRESSURE: 100 MMHG | HEART RATE: 68 BPM | TEMPERATURE: 98.2 F | WEIGHT: 142 LBS | RESPIRATION RATE: 16 BRPM | HEIGHT: 59 IN | BODY MASS INDEX: 28.63 KG/M2 | SYSTOLIC BLOOD PRESSURE: 150 MMHG | OXYGEN SATURATION: 98 %

## 2024-05-01 DIAGNOSIS — I10 ESSENTIAL HYPERTENSION, BENIGN: Primary | ICD-10-CM

## 2024-05-01 DIAGNOSIS — I82.3 THROMBOSIS OF LEFT RENAL VEIN (HCC): ICD-10-CM

## 2024-05-01 DIAGNOSIS — R73.03 PRE-DIABETES: ICD-10-CM

## 2024-05-01 DIAGNOSIS — D68.59 PROTEIN S DEFICIENCY (HCC): ICD-10-CM

## 2024-05-01 DIAGNOSIS — N32.9 LESION OF URINARY BLADDER: ICD-10-CM

## 2024-05-01 PROCEDURE — 3077F SYST BP >= 140 MM HG: CPT | Performed by: INTERNAL MEDICINE

## 2024-05-01 PROCEDURE — 3080F DIAST BP >= 90 MM HG: CPT | Performed by: INTERNAL MEDICINE

## 2024-05-01 PROCEDURE — 99214 OFFICE O/P EST MOD 30 MIN: CPT | Performed by: INTERNAL MEDICINE

## 2024-05-01 RX ORDER — CITALOPRAM 40 MG/1
20 TABLET ORAL
COMMUNITY
Start: 2024-04-16

## 2024-05-01 RX ORDER — AMLODIPINE BESYLATE 5 MG/1
5 TABLET ORAL
Qty: 90 TABLET | Refills: 1 | Status: SHIPPED | OUTPATIENT
Start: 2024-05-01

## 2024-05-01 ASSESSMENT — ENCOUNTER SYMPTOMS
SHORTNESS OF BREATH: 0
DIARRHEA: 0
NAUSEA: 0
VOMITING: 0
ABDOMINAL PAIN: 0
COUGH: 0

## 2024-05-01 NOTE — ASSESSMENT & PLAN NOTE
Blood pressure started Going up again.  Continue bisoprolol.  Will also add amlodipine 5 mg at bedtime

## 2024-05-01 NOTE — ASSESSMENT & PLAN NOTE
Since patient has left renal vein thrombosis, option of starting on Eliquis discussed.  Will start her on 5 mg Eliquis twice a day.  Side effects discussed.  Will make appointment with  hematologist.  Patient has appointment with nephrologist tomorrow.

## 2024-05-01 NOTE — PROGRESS NOTES
\"Have you been to the ER, urgent care clinic since your last visit?  Hospitalized since your last visit?\"    NO    “Have you seen or consulted any other health care providers outside of [facility] since your last visit?”    NO            Click Here for Release of Records Request   
shortness of breath.    Cardiovascular:  Negative for chest pain and leg swelling.        Elevated blood pressure   Gastrointestinal:  Negative for abdominal pain, diarrhea, nausea and vomiting.   Genitourinary:  Negative for dysuria.   Skin:  Negative for rash.   Neurological:  Negative for tremors and headaches.   Psychiatric/Behavioral:  The patient is not nervous/anxious.        BP (!) 150/100 (Site: Right Upper Arm, Position: Sitting, Cuff Size: Medium Adult)   Pulse 68   Temp 98.2 °F (36.8 °C)   Resp 16   Ht 1.499 m (4' 11\")   Wt 64.4 kg (142 lb)   SpO2 98%   BMI 28.68 kg/m²      Physical Exam  Vitals and nursing note reviewed.   Gen:  Not  in no acute distress. Well built and nourished.  HEENT:  [place] conjunctivae, BROOKE, EOMI, hearing intact .Mouth: Moist mucous membranes. No TPC  Neck:  Supple, without masses, thyroid not enlarged  Resp:  No accessory muscle use, clear breath sounds without wheezes rales or rhonchi  Card:  No murmurs, normal S1, S2 without thrills, bruits.  No peripheral edema  Abd:  Soft, non-tender, non-distended, normoactive bowel sounds are present, no palpable organomegaly and no detectable hernias.   Lymph:  No cervical or inguinal adenopathy  Musc:  No cyanosis or clubbing.   Gait: Normal  Skin:  No rashes or ulcers, skin turgor is good.  Positive multiple tattoos   Neuro: Alert and oriented x 3. Cranial nerves are grossly intact, no focal motor weakness, follows commands appropriately  Psych:  Good insight, mood normal.  ASSESSMENT/PLAN:  Below is the assessment and plan developed based on review of pertinent history, physical exam, labs, studies, and medications.    Assessment & Plan     1. Essential hypertension, benign  Assessment & Plan:  Blood pressure started Going up again.  Continue bisoprolol.  Will also add amlodipine 5 mg at bedtime  Orders:  -     amLODIPine (NORVASC) 5 MG tablet; Take 1 tablet by mouth nightly, Disp-90 tablet, R-1Normal  2.

## 2024-05-13 ENCOUNTER — TELEPHONE (OUTPATIENT)
Age: 34
End: 2024-05-13

## 2024-05-13 NOTE — TELEPHONE ENCOUNTER
She has an appointment on the [facility] schedule this week for cysto, CMG, Uflow.  We will need to reschedule this to the [place] location, please.    Yojana.

## 2024-05-15 ENCOUNTER — PROCEDURE VISIT (OUTPATIENT)
Age: 34
End: 2024-05-15
Payer: OTHER GOVERNMENT

## 2024-05-15 VITALS
WEIGHT: 142 LBS | DIASTOLIC BLOOD PRESSURE: 97 MMHG | HEIGHT: 59 IN | RESPIRATION RATE: 16 BRPM | HEART RATE: 71 BPM | BODY MASS INDEX: 28.63 KG/M2 | OXYGEN SATURATION: 99 % | SYSTOLIC BLOOD PRESSURE: 153 MMHG

## 2024-05-15 DIAGNOSIS — R31.29 MICROHEMATURIA: ICD-10-CM

## 2024-05-15 DIAGNOSIS — R35.1 NOCTURIA: ICD-10-CM

## 2024-05-15 DIAGNOSIS — N32.9 LESION OF URINARY BLADDER: Primary | ICD-10-CM

## 2024-05-15 DIAGNOSIS — I82.3 THROMBOSIS OF LEFT RENAL VEIN (HCC): ICD-10-CM

## 2024-05-15 LAB
BILIRUBIN, URINE, POC: NEGATIVE
BLOOD URINE, POC: NEGATIVE
GLUCOSE URINE, POC: NEGATIVE
KETONES, URINE, POC: NEGATIVE
LEUKOCYTE ESTERASE, URINE, POC: NEGATIVE
NITRITE, URINE, POC: NEGATIVE
PH, URINE, POC: 6.5 (ref 4.6–8)
PROTEIN,URINE, POC: NEGATIVE
SPECIFIC GRAVITY, URINE, POC: 1.01 (ref 1–1.03)
URINALYSIS CLARITY, POC: CLEAR
URINALYSIS COLOR, POC: YELLOW
UROBILINOGEN, POC: NORMAL

## 2024-05-15 PROCEDURE — 3077F SYST BP >= 140 MM HG: CPT | Performed by: UROLOGY

## 2024-05-15 PROCEDURE — 51798 US URINE CAPACITY MEASURE: CPT | Performed by: UROLOGY

## 2024-05-15 PROCEDURE — 52000 CYSTOURETHROSCOPY: CPT | Performed by: UROLOGY

## 2024-05-15 PROCEDURE — 51741 ELECTRO-UROFLOWMETRY FIRST: CPT | Performed by: UROLOGY

## 2024-05-15 PROCEDURE — 99213 OFFICE O/P EST LOW 20 MIN: CPT | Performed by: UROLOGY

## 2024-05-15 PROCEDURE — 81003 URINALYSIS AUTO W/O SCOPE: CPT | Performed by: UROLOGY

## 2024-05-15 PROCEDURE — 51725 SIMPLE CYSTOMETROGRAM: CPT | Performed by: UROLOGY

## 2024-05-15 PROCEDURE — 3080F DIAST BP >= 90 MM HG: CPT | Performed by: UROLOGY

## 2024-05-15 NOTE — PROGRESS NOTES
Chief Complaint   Patient presents with    Procedure     cystoscopy     1. Have you been to the ER, urgent care clinic since your last visit?  Hospitalized since your last visit?No    2. Have you seen or consulted any other health care providers outside of the [facility] since your last visit?  Include any pap smears or colon screening. No  BP (!) 153/97   Pulse 71   Resp 16   Ht 1.499 m (4' 11\")   Wt 64.4 kg (142 lb)   SpO2 99%   BMI 28.68 kg/m²

## 2024-05-15 NOTE — ASSESSMENT & PLAN NOTE
CMG today revealed a 300 cc bladder capacity.  She has low end of normal bladder and likely voids frequently secondary to this.  She should manage her fluid intake

## 2024-05-15 NOTE — PROGRESS NOTES
HISTORY OF PRESENT ILLNESS  Lety Nesbitt is a 34 y.o. female.   has a past medical history of Anemia, Granulomatous mastitis of right breast, and Hypertension.  has a past surgical history that includes  section (2019); Endometrial ablation; eye surgery; Breast surgery (Right, 2023); and Cystoscopy (05/15/2024).  Chief Complaint   Patient presents with    Procedure     cystoscopy     Ultrasound of the kidneys 3/31/2024 revealed no hydronephrosis, masses or bladder abnormalities.  There were no vascular abnormalities.    [facility] along the posterior bladder.  There is a question of a left renal vein thrombus as well.    She has tried to restrict fluids in the evenings but does drink a lot of water during the daytime.  She still has nocturia.        1. Lesion of urinary bladder  Overview:  Reported 4 mm bladder lesion on CT  Assessment & Plan:  Cystoscopy today without evidence of pathology.  No concerning findings.  Orders:  -     AMB POC URINALYSIS DIP STICK AUTO W/O MICRO  2. Nocturia  Overview:  Nocturia; unclear if withholding urine during the day; advised on timed voiding and lower extremity elevation before bedtime.  Assessment & Plan:  CMG today revealed a 250 cc bladder capacity.  She has relatively small bladder and likely voids frequently secondary to this.  She should manage her fluid intake  Orders:  -     AMB POC URINALYSIS DIP STICK AUTO W/O MICRO  3. Microhematuria  Overview:  Reported microhematuria with possible 4 mm bladder lesion or mass.  No gross hematuria.  Assessment & Plan:   No concerning findings on ultrasound or cystoscopy.  No evidence of persistent hematuria.  She should observe for gross hematuria or other problems.  4. Thrombosis of left renal vein (HCC)  Assessment & Plan:   No evidence of renal vein thrombosis on Doppler ultrasound.  No evidence of pathology.  No treatment necessary.      No Known Allergies   Prior to Admission

## 2024-05-15 NOTE — ASSESSMENT & PLAN NOTE
No evidence of renal vein thrombosis on Doppler ultrasound.  No evidence of pathology.  No treatment necessary.

## 2024-05-15 NOTE — PROGRESS NOTES
Cystoscopy - Female    Findings:  Initial residual: minimal  Anterior urethra: normal mucosa  Bladder neck: Normal appearing  Bladder mucosa: Intact, no bas fond deformity, did not descend with strain  Trabeculation: none  Diverticula: none  Ureteral orifices: normal, efflux clear urine    CMG    Initial urge at (cc): 200  Strong urge at (cc): 250  Findings: No uninhibited contractions noted.  No urge related incontinence noted    Uroflow/ PVR    Max Flow: 22 ml/sec    Avg flow: 14 ml/sec    Voided Volume:  318ml    Residual Volume:0ml    Shape of the curve: Normal bell shaped curve    Impression: Normal-appearing bladder with adequate filling and emptying.  Bladder capacity is on the lower end of normal.

## 2024-06-11 ENCOUNTER — OFFICE VISIT (OUTPATIENT)
Facility: CLINIC | Age: 34
End: 2024-06-11
Payer: OTHER GOVERNMENT

## 2024-06-11 VITALS
TEMPERATURE: 96.8 F | OXYGEN SATURATION: 100 % | RESPIRATION RATE: 16 BRPM | HEART RATE: 64 BPM | WEIGHT: 148 LBS | HEIGHT: 59 IN | BODY MASS INDEX: 29.84 KG/M2 | SYSTOLIC BLOOD PRESSURE: 128 MMHG | DIASTOLIC BLOOD PRESSURE: 82 MMHG

## 2024-06-11 DIAGNOSIS — N61.21 GRANULOMATOUS MASTITIS OF RIGHT BREAST: ICD-10-CM

## 2024-06-11 DIAGNOSIS — R73.03 PRE-DIABETES: ICD-10-CM

## 2024-06-11 DIAGNOSIS — T38.0X5A PERIORAL DERMATITIS DUE TO CORTICOSTEROID: ICD-10-CM

## 2024-06-11 DIAGNOSIS — I10 ESSENTIAL HYPERTENSION, BENIGN: ICD-10-CM

## 2024-06-11 DIAGNOSIS — L71.0 PERIORAL DERMATITIS DUE TO CORTICOSTEROID: ICD-10-CM

## 2024-06-11 DIAGNOSIS — D68.59 PROTEIN S DEFICIENCY (HCC): ICD-10-CM

## 2024-06-11 DIAGNOSIS — R63.5 WEIGHT GAIN, ABNORMAL: ICD-10-CM

## 2024-06-11 DIAGNOSIS — D68.59 PROTEIN S DEFICIENCY (HCC): Primary | ICD-10-CM

## 2024-06-11 PROCEDURE — 3079F DIAST BP 80-89 MM HG: CPT | Performed by: INTERNAL MEDICINE

## 2024-06-11 PROCEDURE — 99214 OFFICE O/P EST MOD 30 MIN: CPT | Performed by: INTERNAL MEDICINE

## 2024-06-11 PROCEDURE — 3074F SYST BP LT 130 MM HG: CPT | Performed by: INTERNAL MEDICINE

## 2024-06-11 SDOH — ECONOMIC STABILITY: INCOME INSECURITY: HOW HARD IS IT FOR YOU TO PAY FOR THE VERY BASICS LIKE FOOD, HOUSING, MEDICAL CARE, AND HEATING?: NOT HARD AT ALL

## 2024-06-11 SDOH — ECONOMIC STABILITY: FOOD INSECURITY: WITHIN THE PAST 12 MONTHS, YOU WORRIED THAT YOUR FOOD WOULD RUN OUT BEFORE YOU GOT MONEY TO BUY MORE.: NEVER TRUE

## 2024-06-11 SDOH — ECONOMIC STABILITY: FOOD INSECURITY: WITHIN THE PAST 12 MONTHS, THE FOOD YOU BOUGHT JUST DIDN'T LAST AND YOU DIDN'T HAVE MONEY TO GET MORE.: NEVER TRUE

## 2024-06-11 ASSESSMENT — ENCOUNTER SYMPTOMS
DIARRHEA: 0
ABDOMINAL PAIN: 0
VOMITING: 0
COUGH: 0
NAUSEA: 0
SHORTNESS OF BREATH: 0

## 2024-06-11 NOTE — PROGRESS NOTES
Chief Complaint   Patient presents with    Follow-up Chronic Condition     Has appt with hematologist on 18th     Weight Gain    Skin Problem     C/O breakouts            /82 (Site: Left Upper Arm, Position: Sitting)   Pulse 64   Temp 96.8 °F (36 °C) (Temporal)   Resp 16   Ht 1.499 m (4' 11\")   Wt 67.1 kg (148 lb)   SpO2 100%   BMI 29.89 kg/m²         \"Have you been to the ER, urgent care clinic since your last visit?  Hospitalized since your last visit?\"    NO    “Have you seen or consulted any other health care providers outside of [facility] since your last visit?”    NO            Click Here for Release of Records Request    
Pulmonary & Critical Care Consult Note    DATE OF CONSULTATION:  2/18/2018     REFERRING PHYSICIAN: Amandeep Whitaker MD     CONSULTANT:  Modesto Appiah DO     REASON FOR CONSULTATION: Ventilator-dependent respiratory failure, septic shock     HISTORY OF PRESENT ILLNESS: Mr. Edwards is a 35-year-old male with past medical history of traumatic brain injury at age 15, seizures diagnosed in June of 2017. His baseline mental status appears to be nodding and minimal interacting without vocalization. Who was brought to the ICU as a direct admit from [facility] where he was brought today for hypoxia. Reportedly the patient was being weanied off of his phenobarbital by his neurologist when he began having more frequent seizures, approximately one week ago his neurologist increased his Keppra dosing however the seizures continued. His mother did use CBD and THC with some success in slowing the seizures. Since 2 days ago the patient had multiple aspiration events following these seizures. His mother evaluated him with a home oxygen saturation monitor and he was noted to be hypoxic, he was brought to [facility] where he was found to have 28% bands with leukopenia. Chest x-ray was obtained demonstrating bilateral infiltrates right greater than left. He was placed on mechanical ventilation and his oxygenation was unable to be improved more than 85%, he was hypotensive and given multiple boluses of IV fluids without improvement. He was started on levo fed and given multiple doses of Ativan for seizures and transferred to our ICU for higher level of care. He arrives on the ventilator and on pressors critically ill.     PAST MEDICAL HISTORY:   TBI  Seizures  Hypertension     PAST SURGICAL HISTORY:  PEG  Past Surgical History:   Procedure Laterality Date   • LARYNGOSCOPY  7/2/2017    Procedure: LARYNGOSCOPY;  Surgeon: Catherine Osorio M.D.;  Location: SURGERY [facility];  Service:    • BRONCHOSCOPY  7/2/2017 
"   Procedure: BRONCHOSCOPY;  Surgeon: Catherine Osorio M.D.;  Location: SURGERY [facility];  Service:    • TRACHEOSTOMY  7/2/2017    Procedure: TRACHEOSTOMY;  Surgeon: Catherine Osorio M.D.;  Location: SURGERY [facility];  Service:    • ESOPHAGOSCOPY  7/2/2017    Procedure: ESOPHAGOSCOPY;  Surgeon: Catherine Osorio M.D.;  Location: SURGERY [facility];  Service:         ALLERGIES:  Sulfa drugs     MEDICATIONS PRIOR TO ADMISSION:   No current facility-administered medications on file prior to encounter.      Current Outpatient Prescriptions on File Prior to Encounter   Medication Sig Dispense Refill   • levetiracetam (KEPPRA) 100 MG/ML Solution Take 15 mL by mouth every 12 hours. 240 mL        SOCIAL HISTORY:    Social History     Social History   • Marital status: Single     Spouse name: N/A   • Number of children: N/A   • Years of education: N/A     Occupational History   • Not on file.     Social History Main Topics   • Smoking status: Not on file   • Smokeless tobacco: Not on file   • Alcohol use Not on file   • Drug use: Unknown   • Sexual activity: Not on file     Other Topics Concern   • Not on file     Social History Narrative   • No narrative on file       FAMILY HISTORY:   Non-contributory    REVIEW OF SYSTEMS:   Unobtainable secondary to baseline encephalopathy     PHYSICAL EXAMINATION:  Pulse 74   Temp 36.2 °C (97.1 °F)   Ht 1.803 m (5' 11\")   Wt 55.9 kg (123 lb 3.8 oz)   SpO2 98%   BMI 17.19 kg/m²   GENERAL: Chronically-appearing, contractured male in obvious distress on ventilator.  HEENT: Atraumatic, PERRL, anicteric, external nose normal, moist mucus membranes  NECK:  Supple, 6 shilley in place  PULM:  Coarse rhonchi present bilaterally   CVS: Regular rate and rhythm   ABDOMEN: Soft, nontender, G-tube in left upper quadrant with no surrounding erythema or purulence  EXTREMITIES: Contractured, no cyanosis, 1+ edema in bilateral feet  SKIN: Warm, pale, dry. Stage III left "
buttock wound, other wounds to the left foot and medial buttocks  NEURO: Nonverbal, facial twitching consistent with his historical seizures noted multiple times during my exam.    LABORATORY DATA:    Lab Results   Component Value Date/Time    WBC 3.4 (L) 02/18/2018 05:40 PM    RBC 4.83 02/18/2018 05:40 PM    HEMOGLOBIN 13.8 (L) 02/18/2018 05:40 PM    HEMOGLOBIN 13.6 (L) 02/18/2018 05:40 PM    HEMATOCRIT 43.8 02/18/2018 05:40 PM    HEMATOCRIT 44.6 02/18/2018 05:40 PM    MCV 90.7 02/18/2018 05:40 PM    MCH 28.6 02/18/2018 05:40 PM    MCHC 31.5 (L) 02/18/2018 05:40 PM    MPV 11.8 02/18/2018 05:40 PM    NEUTSPOLYS 30.70 (L) 02/18/2018 05:40 PM    LYMPHOCYTES 12.60 (L) 02/18/2018 05:40 PM    MONOCYTES 3.60 02/18/2018 05:40 PM    EOSINOPHILS 0.00 02/18/2018 05:40 PM    EOSINOPHILS 3 06/25/2017 02:40 PM    BASOPHILS 0.00 02/18/2018 05:40 PM    ANISOCYTOSIS 2+ 02/18/2018 05:40 PM      Lab Results   Component Value Date/Time    SODIUM 143 02/18/2018 06:57 PM    POTASSIUM 4.0 02/18/2018 06:57 PM    CHLORIDE 112 02/18/2018 06:57 PM    CO2 20 02/18/2018 06:57 PM    GLUCOSE 93 02/18/2018 06:57 PM    BUN 67 (H) 02/18/2018 06:57 PM    CREATININE 0.32 (L) 02/18/2018 06:57 PM      Lab Results   Component Value Date/Time    PROTHROMBTM 13.3 07/13/2017 04:44 AM    INR 1.03 07/13/2017 04:44 AM         IMAGING:   CXR (personally reviewed)  DX-CHEST-PORTABLE (1 VIEW)   Final Result         1. No significant interval change. Extensive airspace opacity throughout the left lung.      Echocardiogram Comp W/O Cont    (Results Pending)   DX-CHEST-PORTABLE (1 VIEW)    (Results Pending)       ASSESSMENT/PLAN:  Acute hypoxic respiratory   - Trached June 2017   - RT/O2 protocols   - Daily and PRN ABGs   - Titration of ventilator therapy based on ABGs and patient's status   - Sedation as tolerated/indicated   - Daily CXR   - HOB >30 degrees and peridex for VAP prevention   - Pepcid for GI prophylaxis   - SAT/SBT when able (ABCDEF Bundle)   - Early 
dysuria.   Skin:  Positive for rash.   Neurological:  Negative for tremors and headaches.   Psychiatric/Behavioral:  The patient is not nervous/anxious.        /82 (Site: Left Upper Arm, Position: Sitting)   Pulse 64   Temp 96.8 °F (36 °C) (Temporal)   Resp 16   Ht 1.499 m (4' 11\")   Wt 67.1 kg (148 lb)   SpO2 100%   BMI 29.89 kg/m²      Physical Exam  Vitals and nursing note reviewed.   Gen:  Not  in no acute distress. Well built and nourished.  HEENT:  [place] conjunctivae, BROOKE, EOMI, hearing intact .Mouth: Moist mucous membranes. No TPC  Neck:  Supple, without masses, thyroid not enlarged  Resp:  No accessory muscle use, clear breath sounds without wheezes rales or rhonchi  Card:  No murmurs, normal S1, S2 without thrills, bruits.  No peripheral edema  Abd:  Soft, non-tender, non-distended, normoactive bowel sounds are present, no palpable organomegaly and no detectable hernias.   Right breast to do surgical scar.  Positive multiple indurations.  1 small area on lateral aspect mildly tender.  No draining.  Lymph:  No cervical or inguinal adenopathy  Musc:  No cyanosis or clubbing.   Gait: Normal  Skin:  No  ulcers, skin turgor is good.  Positive circumoral dermatitis.  Positive multiple tattoos   Neuro: Alert and oriented x 3. Cranial nerves are grossly intact, no focal motor weakness, follows commands appropriately  Psych:  Good insight, mood normal  ASSESSMENT/PLAN:  Below is the assessment and plan developed based on review of pertinent history, physical exam, labs, studies, and medications.    1. 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  Patient now on Eliquis.  Has appointment coming up with the hematologist.  Patient also has protein S deficiency.  Orders:  -     CBC with Auto Differential; Future  2. Essential hypertension, benign  Comments:  Blood pressure controlled.  Continue current medications.  Orders:  -     Comprehensive 
mobility   - Bronch today    Septic shock   - Source: likely pulmonary   - sepsis protocol   - source targeted antibiotics: flagyl, rocephin   - 30 mL/kg crystalloid bolus provided at OSH   - PRN IVF bolus to maintain MAP >65 mmHg   - Pressors if needed to maintain MAP >65 mmHg   - blood, respiratory and urine cultures   - lactate every 4 hours until normalized or downtrending    Aspiration pneumonia   - Rocephin, flagyl   - No significant risk factors for HCAP      Lactic Acidosis   - Continue sepsis treatment   - trend    Multiple breakthrough seizures, ? Status epilepticus   - Neurology consult   - Keppra, Depakote   - Propofol to sedate if needed   - EEG    Possible hematochezia with hematemesis   - PPI   - Stool occult   - GI consult if positive    Transaminitis   - Resolved    Hypernatremia   - Resolved    Pressure ulcers   - Wound care consult    Prophylaxis: SCDs, Protonix    Patient is critically ill at this time.  I have spent 40 minutes examining this patient, all lab data, x-ray, and discussion with RN, RT, family, hospitalist. Critical care time: 40 min. No time overlap. Procedures not included in time. Thank you for asking me to consult on the patient.  I appreciate the opportunity to assist in their care and will follow along closely with you.    Modesto Appiah, DO  Critical Care Medicine    This dictation was created using voice recognition software. The accuracy of the dictation is limited to the abilities of the software. Errors of grammar and possibly content are to be expected.

## 2024-06-11 NOTE — ASSESSMENT & PLAN NOTE
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  Patient now on Eliquis.  Has appointment coming up with the hematologist.  Patient also has protein S deficiency.

## 2024-06-11 NOTE — ASSESSMENT & PLAN NOTE
Patient has been off prednisone for 1 week.  Advised to restart 20 mg prednisone since she is having some drainage.  Also advised to follow-up with the breast surgeon.

## 2024-06-12 LAB
ALBUMIN SERPL-MCNC: 4.4 G/DL (ref 3.9–4.9)
ALBUMIN/GLOB SERPL: 1.8 {RATIO}
ALP SERPL-CCNC: 69 IU/L (ref 44–121)
ALT SERPL-CCNC: 17 IU/L (ref 0–32)
AST SERPL-CCNC: 18 IU/L (ref 0–40)
BASOPHILS # BLD AUTO: 0 X10E3/UL (ref 0–0.2)
BASOPHILS NFR BLD AUTO: 1 %
BILIRUB SERPL-MCNC: 0.3 MG/DL (ref 0–1.2)
BUN SERPL-MCNC: 9 MG/DL (ref 6–20)
BUN/CREAT SERPL: 12 (ref 9–23)
CALCIUM SERPL-MCNC: 9.7 MG/DL (ref 8.7–10.2)
CHLORIDE SERPL-SCNC: 102 MMOL/L (ref 96–106)
CO2 SERPL-SCNC: 26 MMOL/L (ref 20–29)
CREAT SERPL-MCNC: 0.75 MG/DL (ref 0.57–1)
EGFRCR SERPLBLD CKD-EPI 2021: 106 ML/MIN/1.73
EOSINOPHIL # BLD AUTO: 0.1 X10E3/UL (ref 0–0.4)
EOSINOPHIL NFR BLD AUTO: 2 %
ERYTHROCYTE [DISTWIDTH] IN BLOOD BY AUTOMATED COUNT: 14.4 % (ref 11.7–15.4)
GLOBULIN SER CALC-MCNC: 2.5 G/DL (ref 1.5–4.5)
GLUCOSE SERPL-MCNC: 71 MG/DL (ref 70–99)
HBA1C MFR BLD: 5.8 % (ref 4.8–5.6)
HCT VFR BLD AUTO: 40.9 % (ref 34–46.6)
HGB BLD-MCNC: 13.4 G/DL (ref 11.1–15.9)
IMM GRANULOCYTES # BLD AUTO: 0 X10E3/UL (ref 0–0.1)
IMM GRANULOCYTES NFR BLD AUTO: 0 %
LYMPHOCYTES # BLD AUTO: 2.7 X10E3/UL (ref 0.7–3.1)
LYMPHOCYTES NFR BLD AUTO: 50 %
MCH RBC QN AUTO: 29.9 PG (ref 26.6–33)
MCHC RBC AUTO-ENTMCNC: 32.8 G/DL (ref 31.5–35.7)
MCV RBC AUTO: 91 FL (ref 79–97)
MONOCYTES # BLD AUTO: 0.5 X10E3/UL (ref 0.1–0.9)
MONOCYTES NFR BLD AUTO: 10 %
NEUTROPHILS # BLD AUTO: 2 X10E3/UL (ref 1.4–7)
NEUTROPHILS NFR BLD AUTO: 37 %
PLATELET # BLD AUTO: 306 X10E3/UL (ref 150–450)
POTASSIUM SERPL-SCNC: 4.2 MMOL/L (ref 3.5–5.2)
PROT SERPL-MCNC: 6.9 G/DL (ref 6–8.5)
RBC # BLD AUTO: 4.48 X10E6/UL (ref 3.77–5.28)
SODIUM SERPL-SCNC: 139 MMOL/L (ref 134–144)
WBC # BLD AUTO: 5.3 X10E3/UL (ref 3.4–10.8)

## 2024-07-24 DIAGNOSIS — I10 ESSENTIAL HYPERTENSION, BENIGN: ICD-10-CM

## 2024-07-24 RX ORDER — BISOPROLOL FUMARATE AND HYDROCHLOROTHIAZIDE 5; 6.25 MG/1; MG/1
1 TABLET ORAL DAILY
Qty: 30 TABLET | Refills: 3 | Status: SHIPPED | OUTPATIENT
Start: 2024-07-24

## 2024-07-31 ENCOUNTER — TELEPHONE (OUTPATIENT)
Age: 34
End: 2024-07-31

## 2024-07-31 NOTE — TELEPHONE ENCOUNTER
Received a request on 07/30/2024 from Department of [facility] for entire record. Faxed ov, op, surgical pathology and labs to [phone]

## 2024-08-15 ENCOUNTER — OFFICE VISIT (OUTPATIENT)
Facility: CLINIC | Age: 34
End: 2024-08-15
Payer: OTHER GOVERNMENT

## 2024-08-15 VITALS
HEART RATE: 74 BPM | HEIGHT: 59 IN | SYSTOLIC BLOOD PRESSURE: 125 MMHG | TEMPERATURE: 98.2 F | RESPIRATION RATE: 16 BRPM | BODY MASS INDEX: 28.02 KG/M2 | OXYGEN SATURATION: 98 % | DIASTOLIC BLOOD PRESSURE: 83 MMHG | WEIGHT: 139 LBS

## 2024-08-15 DIAGNOSIS — E78.00 HYPERCHOLESTEREMIA: ICD-10-CM

## 2024-08-15 DIAGNOSIS — I10 ESSENTIAL HYPERTENSION, BENIGN: Primary | ICD-10-CM

## 2024-08-15 DIAGNOSIS — R73.03 PRE-DIABETES: ICD-10-CM

## 2024-08-15 DIAGNOSIS — D68.59 PROTEIN S DEFICIENCY (HCC): ICD-10-CM

## 2024-08-15 PROCEDURE — 3074F SYST BP LT 130 MM HG: CPT | Performed by: INTERNAL MEDICINE

## 2024-08-15 PROCEDURE — 99213 OFFICE O/P EST LOW 20 MIN: CPT | Performed by: INTERNAL MEDICINE

## 2024-08-15 PROCEDURE — 3079F DIAST BP 80-89 MM HG: CPT | Performed by: INTERNAL MEDICINE

## 2024-08-15 ASSESSMENT — ENCOUNTER SYMPTOMS
DIARRHEA: 0
SHORTNESS OF BREATH: 0
VOMITING: 0
COUGH: 0
NAUSEA: 0
ABDOMINAL PAIN: 0

## 2024-08-15 NOTE — PROGRESS NOTES
[facility]  [address], [place] [address]  Phone: [phone]      Lety Nesbitt (: 1990) is a 34 y.o. 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    Her protein S level was previously found to be low. A CTA of her abdomen revealed nonocclusive left gonadal vein thrombosis. She has been on Eliquis 5 mg twice a day and was advised by hematologist Dr. Lori Mcgee to continue this regimen until 2024. She has consulted her OB/GYN who recommended seeing a hematologist. [facility], but due to long wait times, she was referred to this clinic.    She reports that her mastitis is currently stable and not causing any discomfort. She has been off prednisone since 2024. Her last consultation with a breast specialist was in early 2024.    She continues to take amlodipine 5 mg and bisoprolol for hypertension. [facility], which indicated some liver abnormalities. She is not experiencing any new symptoms today.    She underwent ablation for her menstrual cycle and has been making efforts to lose weight through exercise and portion control.    She also has perioral dermatitis and was given shampoo and ointment by the dermatologist.     Labs on 2024 potassium 4.2.  BUN/creatinine normal.  Blood sugar 71.  Total protein 6.9.  LFTs were within normal limit.  A1c was 5.8.  Which has decreased to from 6.4.  White count was 5.3.  Hemoglobin 13.4.  Platelet count 306.    Hemoglobin A1C   Date Value Ref Range Status

## 2024-08-15 NOTE — PROGRESS NOTES
\"Have you been to the ER, urgent care clinic since your last visit?  Hospitalized since your last visit?\"    NO    “Have you seen or consulted any other health care providers outside of [facility] since your last visit?”    YES - When: approximately 1 months ago.  Where and Why: [facility]/Blood Clot in Uterus.    [facility] [place]  July/ Break out on face        Click Here for Release of Records Request    Yes

## 2024-09-19 ENCOUNTER — TELEPHONE (OUTPATIENT)
Age: 34
End: 2024-09-19

## 2024-10-01 DIAGNOSIS — I10 ESSENTIAL HYPERTENSION, BENIGN: ICD-10-CM

## 2024-10-01 RX ORDER — BISOPROLOL FUMARATE AND HYDROCHLOROTHIAZIDE 5; 6.25 MG/1; MG/1
1 TABLET ORAL DAILY
Qty: 90 TABLET | Refills: 1 | Status: SHIPPED | OUTPATIENT
Start: 2024-10-01

## 2024-10-02 ENCOUNTER — OFFICE VISIT (OUTPATIENT)
Facility: CLINIC | Age: 34
End: 2024-10-02
Payer: OTHER GOVERNMENT

## 2024-10-02 VITALS
DIASTOLIC BLOOD PRESSURE: 76 MMHG | TEMPERATURE: 98.2 F | SYSTOLIC BLOOD PRESSURE: 112 MMHG | OXYGEN SATURATION: 98 % | HEIGHT: 59 IN | WEIGHT: 138 LBS | HEART RATE: 86 BPM | BODY MASS INDEX: 27.82 KG/M2

## 2024-10-02 DIAGNOSIS — R76.8 POSITIVE ANA (ANTINUCLEAR ANTIBODY): ICD-10-CM

## 2024-10-02 DIAGNOSIS — D68.59 PROTEIN S DEFICIENCY (HCC): ICD-10-CM

## 2024-10-02 DIAGNOSIS — M94.0 COSTOCHONDRITIS, ACUTE: Primary | ICD-10-CM

## 2024-10-02 DIAGNOSIS — I10 ESSENTIAL HYPERTENSION, BENIGN: ICD-10-CM

## 2024-10-02 DIAGNOSIS — N94.9 ADNEXAL CYST: ICD-10-CM

## 2024-10-02 PROCEDURE — 3074F SYST BP LT 130 MM HG: CPT | Performed by: INTERNAL MEDICINE

## 2024-10-02 PROCEDURE — 99214 OFFICE O/P EST MOD 30 MIN: CPT | Performed by: INTERNAL MEDICINE

## 2024-10-02 PROCEDURE — 3078F DIAST BP <80 MM HG: CPT | Performed by: INTERNAL MEDICINE

## 2024-10-02 RX ORDER — SULFAMETHOXAZOLE/TRIMETHOPRIM 800-160 MG
1 TABLET ORAL 2 TIMES DAILY
COMMUNITY

## 2024-10-02 ASSESSMENT — ENCOUNTER SYMPTOMS
ABDOMINAL PAIN: 0
SHORTNESS OF BREATH: 0
COUGH: 0
NAUSEA: 0
VOMITING: 0
DIARRHEA: 0

## 2024-10-02 NOTE — PROGRESS NOTES
\"Have you been to the ER, urgent care clinic since your last visit?  Hospitalized since your last visit?\"    YES - When: approximately 2 months ago.  Where and Why: [facility]/ abdominal discomfort.    “Have you seen or consulted any other health care providers outside our system since your last visit?”    NO

## 2024-10-02 NOTE — PROGRESS NOTES
[facility]  [address], [place] [address]  Phone: [phone]      Lety Nesbitt (: 1990) is a 34 y.o. 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    She sought emergency care on 2024 due to persistent left flank pain, which has been present for some time but has recently worsened. A CTA scan was performed, revealing no abnormalities. She was not prescribed any antibiotics upon discharge.    She is currently taking Augmentin and Bactrim, prescribed by her dermatologist for facial acne. She continues to take Eliquis and has a scheduled appointment with her hematologist this Friday.     She experiences dull pain under her left breast daily, which intensifies when she lies on it. She has not been lifting . The pain remains unchanged. She reports no fever or painful urination.    [facility]. She had blood work done in 2024. She underwent a cystoscopy, which revealed no abnormalities. She rates her pain as 3 to 4 out of 10.    Her OB/GYN is based at [facility].     Lab review on 2024 white count was 5.14.  Hemoglobin 12.7.  Platelet count 233.  Potassium 3.6.  BUN/creatinine normal.  Magnesium was low at 1.6.  Lipase was checked which was 56.  Urine analysis was unremarkable.  Pregnancy test was negative.  CT abdomen and pelvics showed no findings for urinary tract calculi.  Borderline wall thickening of urinary bladder.  Right adnexal cystic lesion.  No acute intestinal process.  No focal airspace disease.

## 2024-10-19 DIAGNOSIS — I10 ESSENTIAL HYPERTENSION, BENIGN: ICD-10-CM

## 2024-10-21 RX ORDER — AMLODIPINE BESYLATE 5 MG/1
5 TABLET ORAL
Qty: 90 TABLET | Refills: 1 | Status: SHIPPED | OUTPATIENT
Start: 2024-10-21

## 2024-12-15 DIAGNOSIS — E78.00 HYPERCHOLESTEREMIA: ICD-10-CM

## 2024-12-15 DIAGNOSIS — R73.03 PRE-DIABETES: ICD-10-CM

## 2024-12-15 DIAGNOSIS — I10 ESSENTIAL HYPERTENSION, BENIGN: ICD-10-CM

## 2024-12-15 DIAGNOSIS — D68.59 PROTEIN S DEFICIENCY (HCC): ICD-10-CM

## 2024-12-17 LAB
ALBUMIN SERPL-MCNC: 4 G/DL (ref 3.9–4.9)
ALP SERPL-CCNC: 68 IU/L (ref 44–121)
ALT SERPL-CCNC: 30 IU/L (ref 0–32)
AST SERPL-CCNC: 23 IU/L (ref 0–40)
BASOPHILS # BLD AUTO: 0 X10E3/UL (ref 0–0.2)
BASOPHILS NFR BLD AUTO: 0 %
BILIRUB SERPL-MCNC: 0.3 MG/DL (ref 0–1.2)
BUN SERPL-MCNC: 8 MG/DL (ref 6–20)
BUN/CREAT SERPL: 11 (ref 9–23)
CALCIUM SERPL-MCNC: 9.3 MG/DL (ref 8.7–10.2)
CHLORIDE SERPL-SCNC: 104 MMOL/L (ref 96–106)
CHOLEST SERPL-MCNC: 184 MG/DL (ref 100–199)
CO2 SERPL-SCNC: 21 MMOL/L (ref 20–29)
CREAT SERPL-MCNC: 0.73 MG/DL (ref 0.57–1)
EGFRCR SERPLBLD CKD-EPI 2021: 109 ML/MIN/1.73
EOSINOPHIL # BLD AUTO: 0.1 X10E3/UL (ref 0–0.4)
EOSINOPHIL NFR BLD AUTO: 3 %
ERYTHROCYTE [DISTWIDTH] IN BLOOD BY AUTOMATED COUNT: 12.8 % (ref 11.7–15.4)
GLOBULIN SER CALC-MCNC: 2.5 G/DL (ref 1.5–4.5)
GLUCOSE SERPL-MCNC: 85 MG/DL (ref 70–99)
HBA1C MFR BLD: 5.5 % (ref 4.8–5.6)
HCT VFR BLD AUTO: 39.6 % (ref 34–46.6)
HDLC SERPL-MCNC: 59 MG/DL
HGB BLD-MCNC: 13.3 G/DL (ref 11.1–15.9)
IMM GRANULOCYTES # BLD AUTO: 0 X10E3/UL (ref 0–0.1)
IMM GRANULOCYTES NFR BLD AUTO: 0 %
LDLC SERPL CALC-MCNC: 103 MG/DL (ref 0–99)
LYMPHOCYTES # BLD AUTO: 2 X10E3/UL (ref 0.7–3.1)
LYMPHOCYTES NFR BLD AUTO: 46 %
MCH RBC QN AUTO: 31.7 PG (ref 26.6–33)
MCHC RBC AUTO-ENTMCNC: 33.6 G/DL (ref 31.5–35.7)
MCV RBC AUTO: 94 FL (ref 79–97)
MONOCYTES # BLD AUTO: 0.3 X10E3/UL (ref 0.1–0.9)
MONOCYTES NFR BLD AUTO: 7 %
NEUTROPHILS # BLD AUTO: 1.9 X10E3/UL (ref 1.4–7)
NEUTROPHILS NFR BLD AUTO: 44 %
PLATELET # BLD AUTO: 276 X10E3/UL (ref 150–450)
POTASSIUM SERPL-SCNC: 4.3 MMOL/L (ref 3.5–5.2)
PROT SERPL-MCNC: 6.5 G/DL (ref 6–8.5)
RBC # BLD AUTO: 4.2 X10E6/UL (ref 3.77–5.28)
SODIUM SERPL-SCNC: 137 MMOL/L (ref 134–144)
TRIGL SERPL-MCNC: 124 MG/DL (ref 0–149)
TSH SERPL DL<=0.005 MIU/L-ACNC: 0.8 UIU/ML (ref 0.45–4.5)
VLDLC SERPL CALC-MCNC: 22 MG/DL (ref 5–40)
WBC # BLD AUTO: 4.5 X10E3/UL (ref 3.4–10.8)

## 2025-02-17 ENCOUNTER — OFFICE VISIT (OUTPATIENT)
Age: 35
End: 2025-02-17
Payer: OTHER GOVERNMENT

## 2025-02-17 VITALS — BODY MASS INDEX: 27.82 KG/M2 | WEIGHT: 138 LBS | HEIGHT: 59 IN

## 2025-02-17 DIAGNOSIS — N64.4 MASTODYNIA OF RIGHT BREAST: Primary | ICD-10-CM

## 2025-02-17 PROCEDURE — 99213 OFFICE O/P EST LOW 20 MIN: CPT | Performed by: SURGERY

## 2025-02-17 PROCEDURE — 76642 ULTRASOUND BREAST LIMITED: CPT | Performed by: SURGERY

## 2025-02-17 NOTE — PROGRESS NOTES
HISTORY OF PRESENT ILLNESS  Lety Nesbitt is a 34 y.o. female     HPI ESTABLISHED patient here today with complaints of RIGHT breast firmness and sharp, stabbing pain x 1 month.  Getting worse.  No drainage.  No medication.      9/2023 OR  I & D  Pt had 3 more surgeries after  Last visit with me 4/2024    Physical Exam  Chest:   Breasts:     Right: Tenderness present. No swelling, mass or skin change.      Left: No swelling, mass, skin change or tenderness.       Lymphadenopathy:      Upper Body:      Right upper body: No axillary adenopathy.      Left upper body: No axillary adenopathy.        BREAST ULTRASOUND  Indication: RIGHT breast pain.  Hx of granulomatous mastitis  Technique:  The RIGHT breast and axilla were scanned using a high-frequency linear-array near-field transducer  Findings: RIGHT UOQ 1.8cm oval bilobed mass.  No large fluid collection.  No inflammation.   Impression: residual scar tissue after prolonged course including 4 surgeries for granulomatous mastitis.     ASSESSMENT and PLAN   Diagnosis Orders   1. Mastodynia of right breast        I spent 20 minutes reviewing previous notes and test results, face to face with the patient discussing diagnosis and treatment options, and documenting today's visit.    RIGHT breast pain.  Hx of granulomatous mastitis.  The area has been sensitive since her surgeries, but recently felt worse.  Small area of scar tissue.  No sign of recurrent disease.   C/w musculoskeletal pain.  May use heat and ibuprofen if needed for pain.  Follow up if symptoms get worse.

## 2025-05-17 DIAGNOSIS — I10 ESSENTIAL HYPERTENSION, BENIGN: ICD-10-CM

## 2025-05-19 RX ORDER — BISOPROLOL FUMARATE AND HYDROCHLOROTHIAZIDE 5; 6.25 MG/1; MG/1
1 TABLET ORAL DAILY
Qty: 90 TABLET | Refills: 0 | Status: SHIPPED | OUTPATIENT
Start: 2025-05-19

## 2025-08-18 ENCOUNTER — OFFICE VISIT (OUTPATIENT)
Facility: CLINIC | Age: 35
End: 2025-08-18
Payer: OTHER GOVERNMENT

## 2025-08-18 VITALS
OXYGEN SATURATION: 99 % | BODY MASS INDEX: 27.42 KG/M2 | DIASTOLIC BLOOD PRESSURE: 88 MMHG | SYSTOLIC BLOOD PRESSURE: 129 MMHG | HEART RATE: 90 BPM | WEIGHT: 136 LBS | TEMPERATURE: 98.3 F | HEIGHT: 59 IN | RESPIRATION RATE: 16 BRPM

## 2025-08-18 DIAGNOSIS — I10 ESSENTIAL HYPERTENSION, BENIGN: ICD-10-CM

## 2025-08-18 DIAGNOSIS — R79.89 DECREASED THYROID STIMULATING HORMONE (TSH) LEVEL: Primary | ICD-10-CM

## 2025-08-18 DIAGNOSIS — E78.00 HYPERCHOLESTEROLEMIA: ICD-10-CM

## 2025-08-18 DIAGNOSIS — D68.59 PROTEIN S DEFICIENCY: ICD-10-CM

## 2025-08-18 DIAGNOSIS — N61.21 GRANULOMATOUS MASTITIS OF RIGHT BREAST: ICD-10-CM

## 2025-08-18 DIAGNOSIS — R73.03 PRE-DIABETES: ICD-10-CM

## 2025-08-18 PROCEDURE — 99214 OFFICE O/P EST MOD 30 MIN: CPT | Performed by: INTERNAL MEDICINE

## 2025-08-18 PROCEDURE — 3079F DIAST BP 80-89 MM HG: CPT | Performed by: INTERNAL MEDICINE

## 2025-08-18 PROCEDURE — 3074F SYST BP LT 130 MM HG: CPT | Performed by: INTERNAL MEDICINE

## 2025-08-18 SDOH — ECONOMIC STABILITY: FOOD INSECURITY: WITHIN THE PAST 12 MONTHS, YOU WORRIED THAT YOUR FOOD WOULD RUN OUT BEFORE YOU GOT MONEY TO BUY MORE.: NEVER TRUE

## 2025-08-18 SDOH — ECONOMIC STABILITY: FOOD INSECURITY: WITHIN THE PAST 12 MONTHS, THE FOOD YOU BOUGHT JUST DIDN'T LAST AND YOU DIDN'T HAVE MONEY TO GET MORE.: NEVER TRUE

## 2025-08-18 ASSESSMENT — PATIENT HEALTH QUESTIONNAIRE - PHQ9
SUM OF ALL RESPONSES TO PHQ QUESTIONS 1-9: 0
2. FEELING DOWN, DEPRESSED OR HOPELESS: NOT AT ALL
SUM OF ALL RESPONSES TO PHQ QUESTIONS 1-9: 0
1. LITTLE INTEREST OR PLEASURE IN DOING THINGS: NOT AT ALL
SUM OF ALL RESPONSES TO PHQ QUESTIONS 1-9: 0
SUM OF ALL RESPONSES TO PHQ QUESTIONS 1-9: 0

## 2025-08-18 ASSESSMENT — ENCOUNTER SYMPTOMS
SHORTNESS OF BREATH: 0
COUGH: 0
ABDOMINAL PAIN: 0
DIARRHEA: 0
VOMITING: 0
NAUSEA: 0

## 2025-08-22 LAB
ALBUMIN SERPL-MCNC: 4.2 G/DL (ref 3.9–4.9)
ALP SERPL-CCNC: 64 IU/L (ref 44–121)
ALT SERPL-CCNC: 12 IU/L (ref 0–32)
AST SERPL-CCNC: 13 IU/L (ref 0–40)
BILIRUB SERPL-MCNC: <0.2 MG/DL (ref 0–1.2)
BUN SERPL-MCNC: 8 MG/DL (ref 6–20)
BUN/CREAT SERPL: 11 (ref 9–23)
CALCIUM SERPL-MCNC: 9 MG/DL (ref 8.7–10.2)
CHLORIDE SERPL-SCNC: 107 MMOL/L (ref 96–106)
CHOLEST SERPL-MCNC: 169 MG/DL (ref 100–199)
CO2 SERPL-SCNC: 21 MMOL/L (ref 20–29)
CREAT SERPL-MCNC: 0.72 MG/DL (ref 0.57–1)
EGFRCR SERPLBLD CKD-EPI 2021: 110 ML/MIN/1.73
GLOBULIN SER CALC-MCNC: 2.7 G/DL (ref 1.5–4.5)
GLUCOSE SERPL-MCNC: 96 MG/DL (ref 70–99)
HDLC SERPL-MCNC: 53 MG/DL
LDLC SERPL CALC-MCNC: 97 MG/DL (ref 0–99)
POTASSIUM SERPL-SCNC: 3.9 MMOL/L (ref 3.5–5.2)
PROT SERPL-MCNC: 6.9 G/DL (ref 6–8.5)
SODIUM SERPL-SCNC: 140 MMOL/L (ref 134–144)
TRIGL SERPL-MCNC: 104 MG/DL (ref 0–149)
VLDLC SERPL CALC-MCNC: 19 MG/DL (ref 5–40)

## (undated) DEVICE — STRIP,CLOSURE,WOUND,MEDI-STRIP,1/2X4: Brand: MEDLINE

## (undated) DEVICE — FLUID MGMT SYS FLUENT KIT 6/PK

## (undated) DEVICE — NDL SPNE QNCKE 22GX3.5IN LF --

## (undated) DEVICE — TUBING, SUCTION, 1/4" X 10', STRAIGHT: Brand: MEDLINE

## (undated) DEVICE — SOLUTION IRRIG 500ML 0.9% SOD CHLO USP POUR PLAS BTL

## (undated) DEVICE — DRAPE,REIN 53X77,STERILE: Brand: MEDLINE

## (undated) DEVICE — PENCIL ES CRD L10FT HND SWCHING ROCK SWCH W/ EDGE COAT BLDE

## (undated) DEVICE — KIT THERMOABLATION 6MM ENDOMET DEV NOVASURE

## (undated) DEVICE — DEVICE TISS REM IU CANSTR VAC TB FT PEDAL DISPOSABLE MYOSURE

## (undated) DEVICE — GLOVE SURG SZ 65 THK91MIL LTX FREE SYN POLYISOPRENE

## (undated) DEVICE — CHEST PACK-SFMCASU: Brand: MEDLINE INDUSTRIES, INC.

## (undated) DEVICE — SOLUTION IRRIG 3000ML 0.9% SOD CHL USP UROMATIC PLAS CONT

## (undated) DEVICE — SET SEALS HYSTEROSCOPE DISP -- MYOSURE  EA=10

## (undated) DEVICE — GLOVE ORTHO 8   MSG9480

## (undated) DEVICE — SYR 10ML LUER LOK 1/5ML GRAD --

## (undated) DEVICE — LEGGINGS, PAIR, 31X48, STERILE: Brand: MEDLINE

## (undated) DEVICE — HYPODERMIC SAFETY NEEDLE: Brand: MONOJECT

## (undated) DEVICE — PERI GYN-SFMC: Brand: MEDLINE INDUSTRIES, INC.